# Patient Record
Sex: FEMALE | Race: WHITE | Employment: OTHER | URBAN - NONMETROPOLITAN AREA
[De-identification: names, ages, dates, MRNs, and addresses within clinical notes are randomized per-mention and may not be internally consistent; named-entity substitution may affect disease eponyms.]

---

## 2019-06-13 ENCOUNTER — HOSPITAL ENCOUNTER (EMERGENCY)
Age: 79
Discharge: HOME | End: 2019-06-13
Attending: EMERGENCY MEDICINE
Payer: MEDICARE

## 2019-06-13 VITALS
WEIGHT: 165 LBS | TEMPERATURE: 98.1 F | DIASTOLIC BLOOD PRESSURE: 78 MMHG | RESPIRATION RATE: 18 BRPM | SYSTOLIC BLOOD PRESSURE: 118 MMHG | HEIGHT: 62 IN | OXYGEN SATURATION: 99 % | BODY MASS INDEX: 30.36 KG/M2 | HEART RATE: 78 BPM

## 2019-06-13 DIAGNOSIS — N30.00 ACUTE CYSTITIS WITHOUT HEMATURIA: Primary | ICD-10-CM

## 2019-06-13 LAB
A/G RATIO: 1 (ref 1.1–2.2)
ALBUMIN SERPL-MCNC: 3.9 G/DL (ref 3.4–5)
ALP BLD-CCNC: 112 U/L (ref 40–129)
ALT SERPL-CCNC: 20 U/L (ref 10–40)
AMORPHOUS: ABNORMAL /HPF
ANION GAP SERPL CALCULATED.3IONS-SCNC: 11 MMOL/L (ref 3–16)
AST SERPL-CCNC: 25 U/L (ref 15–37)
BACTERIA: ABNORMAL /HPF
BASOPHILS ABSOLUTE: 0 K/UL (ref 0–0.2)
BASOPHILS RELATIVE PERCENT: 0.9 %
BILIRUB SERPL-MCNC: 0.4 MG/DL (ref 0–1)
BILIRUBIN URINE: NEGATIVE
BLOOD, URINE: NEGATIVE
BUN BLDV-MCNC: 23 MG/DL (ref 7–20)
CALCIUM SERPL-MCNC: 9.1 MG/DL (ref 8.3–10.6)
CHLORIDE BLD-SCNC: 105 MMOL/L (ref 99–110)
CLARITY: CLEAR
CO2: 26 MMOL/L (ref 21–32)
COLOR: YELLOW
CREAT SERPL-MCNC: 1.3 MG/DL (ref 0.6–1.2)
EKG ATRIAL RATE: 67 BPM
EKG DIAGNOSIS: NORMAL
EKG P AXIS: 48 DEGREES
EKG P-R INTERVAL: 236 MS
EKG Q-T INTERVAL: 484 MS
EKG QRS DURATION: 82 MS
EKG QTC CALCULATION (BAZETT): 511 MS
EKG R AXIS: 34 DEGREES
EKG T AXIS: 59 DEGREES
EKG VENTRICULAR RATE: 67 BPM
EOSINOPHILS ABSOLUTE: 0.4 K/UL (ref 0–0.6)
EOSINOPHILS RELATIVE PERCENT: 8.7 %
EPITHELIAL CELLS, UA: ABNORMAL /HPF
GFR AFRICAN AMERICAN: 48
GFR NON-AFRICAN AMERICAN: 40
GLOBULIN: 3.8 G/DL
GLUCOSE BLD-MCNC: 95 MG/DL (ref 70–99)
GLUCOSE BLD-MCNC: 95 MG/DL (ref 70–99)
GLUCOSE URINE: NEGATIVE MG/DL
HCT VFR BLD CALC: 36.9 % (ref 36–48)
HEMOGLOBIN: 12.1 G/DL (ref 12–16)
KETONES, URINE: NEGATIVE MG/DL
LACTIC ACID: 1.3 MMOL/L (ref 0.4–2)
LEUKOCYTE ESTERASE, URINE: ABNORMAL
LYMPHOCYTES ABSOLUTE: 1.7 K/UL (ref 1–5.1)
LYMPHOCYTES RELATIVE PERCENT: 38.4 %
MAGNESIUM: 1.9 MG/DL (ref 1.8–2.4)
MCH RBC QN AUTO: 30.6 PG (ref 26–34)
MCHC RBC AUTO-ENTMCNC: 32.9 G/DL (ref 31–36)
MCV RBC AUTO: 92.8 FL (ref 80–100)
MICROSCOPIC EXAMINATION: YES
MONOCYTES ABSOLUTE: 0.4 K/UL (ref 0–1.3)
MONOCYTES RELATIVE PERCENT: 8.8 %
NEUTROPHILS ABSOLUTE: 1.9 K/UL (ref 1.7–7.7)
NEUTROPHILS RELATIVE PERCENT: 43.2 %
NITRITE, URINE: POSITIVE
PDW BLD-RTO: 13.9 % (ref 12.4–15.4)
PERFORMED ON: NORMAL
PH UA: 5 (ref 5–8)
PLATELET # BLD: 233 K/UL (ref 135–450)
PMV BLD AUTO: 7.1 FL (ref 5–10.5)
POTASSIUM SERPL-SCNC: 4.3 MMOL/L (ref 3.5–5.1)
PROTEIN UA: NEGATIVE MG/DL
RBC # BLD: 3.97 M/UL (ref 4–5.2)
RBC UA: ABNORMAL /HPF (ref 0–2)
SODIUM BLD-SCNC: 142 MMOL/L (ref 136–145)
SPECIFIC GRAVITY UA: 1.01 (ref 1–1.03)
TOTAL PROTEIN: 7.7 G/DL (ref 6.4–8.2)
TROPONIN: <0.01 NG/ML
URINE REFLEX TO CULTURE: YES
URINE TYPE: ABNORMAL
UROBILINOGEN, URINE: 0.2 E.U./DL
WBC # BLD: 4.3 K/UL (ref 4–11)
WBC UA: ABNORMAL /HPF (ref 0–5)

## 2019-06-13 PROCEDURE — 83735 ASSAY OF MAGNESIUM: CPT

## 2019-06-13 PROCEDURE — 99285 EMERGENCY DEPT VISIT HI MDM: CPT

## 2019-06-13 PROCEDURE — 96365 THER/PROPH/DIAG IV INF INIT: CPT

## 2019-06-13 PROCEDURE — 6360000002 HC RX W HCPCS: Performed by: EMERGENCY MEDICINE

## 2019-06-13 PROCEDURE — 36415 COLL VENOUS BLD VENIPUNCTURE: CPT

## 2019-06-13 PROCEDURE — 87077 CULTURE AEROBIC IDENTIFY: CPT

## 2019-06-13 PROCEDURE — 93010 ELECTROCARDIOGRAM REPORT: CPT | Performed by: INTERNAL MEDICINE

## 2019-06-13 PROCEDURE — 87086 URINE CULTURE/COLONY COUNT: CPT

## 2019-06-13 PROCEDURE — 93005 ELECTROCARDIOGRAM TRACING: CPT | Performed by: EMERGENCY MEDICINE

## 2019-06-13 PROCEDURE — 81001 URINALYSIS AUTO W/SCOPE: CPT

## 2019-06-13 PROCEDURE — 2580000003 HC RX 258: Performed by: EMERGENCY MEDICINE

## 2019-06-13 PROCEDURE — 87040 BLOOD CULTURE FOR BACTERIA: CPT

## 2019-06-13 PROCEDURE — 96375 TX/PRO/DX INJ NEW DRUG ADDON: CPT

## 2019-06-13 PROCEDURE — 80053 COMPREHEN METABOLIC PANEL: CPT

## 2019-06-13 PROCEDURE — 84484 ASSAY OF TROPONIN QUANT: CPT

## 2019-06-13 PROCEDURE — 85025 COMPLETE CBC W/AUTO DIFF WBC: CPT

## 2019-06-13 PROCEDURE — 87186 SC STD MICRODIL/AGAR DIL: CPT

## 2019-06-13 PROCEDURE — 83605 ASSAY OF LACTIC ACID: CPT

## 2019-06-13 RX ORDER — BRINZOLAMIDE 10 MG/ML
1 SUSPENSION/ DROPS OPHTHALMIC 3 TIMES DAILY
COMMUNITY
End: 2021-09-17

## 2019-06-13 RX ORDER — CEFUROXIME AXETIL 500 MG/1
500 TABLET ORAL 2 TIMES DAILY
Qty: 14 TABLET | Refills: 0 | Status: SHIPPED | OUTPATIENT
Start: 2019-06-13 | End: 2019-06-20

## 2019-06-13 RX ORDER — 0.9 % SODIUM CHLORIDE 0.9 %
1000 INTRAVENOUS SOLUTION INTRAVENOUS ONCE
Status: COMPLETED | OUTPATIENT
Start: 2019-06-13 | End: 2019-06-13

## 2019-06-13 RX ORDER — MAGNESIUM SULFATE 1 G/100ML
1 INJECTION INTRAVENOUS ONCE
Status: COMPLETED | OUTPATIENT
Start: 2019-06-13 | End: 2019-06-13

## 2019-06-13 RX ADMIN — MAGNESIUM SULFATE HEPTAHYDRATE 1 G: 1 INJECTION, SOLUTION INTRAVENOUS at 09:42

## 2019-06-13 RX ADMIN — SODIUM CHLORIDE 1000 ML: 9 INJECTION, SOLUTION INTRAVENOUS at 09:41

## 2019-06-13 RX ADMIN — CEFTRIAXONE SODIUM 1 G: 1 INJECTION, POWDER, FOR SOLUTION INTRAMUSCULAR; INTRAVENOUS at 11:57

## 2019-06-13 NOTE — ED NOTES
Pt now responding verbally & appropriately with questions asked via staff. A & O x 3.  Denies c/o's @ present, labs pending, warm blankets given via request      Marissa Charles RN  06/13/19 0913

## 2019-06-13 NOTE — ED NOTES
Pt voided approx 150 cc of clr lt yellow urine to bedpan without incident or c/o's     David Jama RN  06/13/19 6989

## 2019-06-13 NOTE — ED PROVIDER NOTES
[facility]  eMERGENCY dEPARTMENT eNCOUnter      Pt Name: Eb Moser  MRN: [id number]  [place] 1940  Date of evaluation: 6/13/2019  Provider: Jo Ann Mireles MD    CHIEF COMPLAINT       Chief Complaint   Patient presents with    Altered Mental Status     ECF states pt with decreased LOC, unknown onset of s/s         HISTORY OF PRESENT ILLNESS   (Location/Symptom, Timing/Onset, Context/Setting, Quality, Duration, Modifying Factors, Severity)  Note limiting factors. Eb Moser is a 70 y.o. female with a complicated past medical history including Parkinson's disease, dementia, and seizures who presents from her long-term care facility for decreased level of consciousness for an unknown period of time. I have spoken to Duane Renee the patient's nurse at her nursing home. She reports that the patient is normally more oriented and able to have conversations however has not been that way today. The nursing home is unaware of the last time she was completely at her dementia baseline. They report that the patient is DNR CC and does not want any aggressive treatments. They deny any fever, inability to tolerate p.o., or syncope. Report her symptoms are mild to moderate, constant, worsening, have no known aggravating or alleviating factors, and have been ongoing for an unknown amount of time. HPI    Nursing Notes were reviewed. REVIEW OFSYSTEMS    (2-9 systems for level 4, 10 or more for level 5)     Review of Systems   Unable to perform ROS: Dementia       Except as noted above the remainder of the review of systems was reviewed and negative.        PAST MEDICAL HISTORY     Past Medical History:   Diagnosis Date    Anemia     Atherosclerotic cardiovascular disease     CAD (coronary artery disease)     CKD (chronic kidney disease)     [facility]ulsions [facility]     Dementia     Depression     Depression     Diabetes ([facility]     Diabetes mellitus ([facility]     Falls     Glaucoma     HTN (hypertension)     Hyperlipidemia     Insomnia     Parkinson disease (HCC)     Seizures (HCC)     Type II or unspecified type diabetes mellitus without mention of complication, not stated as uncontrolled     Weakness          SURGICAL HISTORY       Past Surgical History:   Procedure Laterality Date     SECTION      CHOLECYSTECTOMY      CORONARY ARTERY BYPASS GRAFT  [id number]    FRACTURE SURGERY Left     HIP SURGERY  1/25/15    right hip pinning    HYSTERECTOMY      ROTATOR CUFF REPAIR           CURRENT MEDICATIONS       Previous Medications    ACETAMINOPHEN (TYLENOL) 325 MG TABLET    Take 650 mg by mouth every 6 hours as needed for Pain. ASPIRIN 81 MG TABLET    Take 1 tablet by mouth daily. ATORVASTATIN (LIPITOR) 20 MG TABLET    TAKE 1 TABLET BY MOUTH DAILY. BISACODYL (DULCOLAX) 10 MG SUPPOSITORY    Place 10 mg rectally daily. BISACODYL (DULCOLAX) 5 MG EC TABLET    Take 5 mg by mouth daily as needed for Constipation. BRIMONIDINE (ALPHAGAN) 0.2 % OPHTHALMIC SOLUTION    1 drop 3 times daily    BRINZOLAMIDE (AZOPT) 1 % OPHTHALMIC SUSPENSION    Place 1 drop into both eyes 3 times daily    CITALOPRAM (CELEXA) 40 MG TABLET    Take 40 mg by mouth daily. DOCUSATE SODIUM (COLACE) 100 MG CAPSULE    Take 100 mg by mouth 2 times daily. INSULIN DETEMIR (LEVEMIR) 100 UNIT/ML INJECTION VIAL    Inject 12 Units into the skin nightly. LATANOPROST (XALATAN) 0.005 % OPHTHALMIC SOLUTION    Place 1 drop into both eyes nightly. LEVETIRACETAM (KEPPRA) 500 MG TABLET    Take 1 tablet by mouth 2 times daily. LORATADINE (CLARITIN) 10 MG TABLET    Take 10 mg by mouth daily. ALLERGIES     Iodides; Ambien [zolpidem tartrate]; and Cymbalta [duloxetine hcl]    FAMILY HISTORY     History reviewed. No pertinent family history. SOCIAL HISTORY       Social History     Socioeconomic History    Marital status:       Spouse name: None    Number of children: None    Years of education: None    Highest education level: None   Occupational History    None   Social Needs    Financial resource strain: None    Food insecurity:     Worry: None     Inability: None    Transportation needs:     Medical: None     Non-medical: None   Tobacco Use    Smoking status: Former Smoker     Last attempt to quit: 1995     Years since quittin.4    Smokeless tobacco: Never Used   Substance and Sexual Activity    Alcohol use: No    Drug use: No    Sexual activity: Never   Lifestyle    Physical activity:     Days per week: None     Minutes per session: None    Stress: None   Relationships    Social connections:     Talks on phone: None     Gets together: None     Attends Uats[employer] service: None     Active member of club or organization: None     Attends meetings of clubs or organizations: None     Relationship status: None    Intimate partner violence:     Fear of current or ex partner: None     Emotionally abused: None     Physically abused: None     Forced sexual activity: None   Other Topics Concern    None   Social History Narrative    None         PHYSICAL EXAM    (up to 7 for level 4, 8 or more for level 5)     ED Triage Vitals [19 0804]   BP Temp Temp Source Pulse Resp SpO2 Height Weight   (!) 147/59 98.1 °F (36.7 °C) Oral 66 20 97 % 5' 2\" (1.575 m) 165 lb (74.8 kg)       Physical Exam   Constitutional: She appears well-developed and well-nourished. HENT:   Head: Normocephalic and atraumatic. Right Ear: External ear normal.   Left Ear: External ear normal.   Eyes: Conjunctivae and EOM are normal.   Neck: Neck supple. No JVD present. No tracheal deviation present. Cardiovascular: Normal rate and intact distal pulses. Pulmonary/Chest: Effort normal and breath sounds normal. No respiratory distress. She has no wheezes. Abdominal: Soft. She exhibits no distension. There is no tenderness. There is no rebound and no guarding.    Musculoskeletal: Normal range of motion. She exhibits no tenderness or deformity. Neurological: She is alert. No cranial nerve deficit. Oriented to person and that the fact that she is in the emergency department but does not know at which medical facility and does not know the year. No facial droop. Able to stand on her own power but unable to ambulate due to weakness. Intact sensation in all 4 extremities. Generalized deconditioning and weakness equal in all 4 extremity's with no focal area of weakness. Skin: Skin is warm and dry. Capillary refill takes less than 2 seconds. She is not diaphoretic. Nursing note and vitals reviewed. DIAGNOSTIC RESULTS       LABS:  Labs Reviewed   URINE RT REFLEX TO CULTURE - Abnormal; Notable for the following components:       Result Value    Nitrite, Urine POSITIVE (*)     Leukocyte Esterase, Urine SMALL (*)     All other components within normal limits    Narrative:     Performed at:  [facility]. [facility]  [address] [place] [address]. [place] [address] [address]    Phone [phone]   CBC WITH AUTO DIFFERENTIAL - Abnormal; Notable for the following components:    RBC 3.97 (*)     All other components within normal limits    Narrative:     Performed at:  [facility]. [facility]  [address] [place] [address]. [facility][note 3] DEQ    Phone [phone]   COMPREHENSIVE METABOLIC PANEL - Abnormal; Notable for the following components:    BUN 23 (*)     CREATININE 1.3 (*)     GFR Non- 40 (*)     GFR African American 48 (*)     Albumin/Globulin Ratio 1.0 (*)     All other components within normal limits    Narrative:     Performed at:  [facility]. [facility]  [address] [place] [address].  [facility][note 3] DEQ    Phone [phone]   MICROSCOPIC URINALYSIS - Abnormal; Notable for the following components:    WBC, UA 10-20 (*)     Bacteria, UA 3+ (*)     Amorphous, UA Rare (*)     All other components within normal limits    Narrative:     Performed at:  [facility]. [facility]  [facility] [place],  [address] [place]   Phone [phone]   CULTURE BLOOD #1   CULTURE BLOOD #2   URINE CULTURE   TROPONIN    Narrative:     Performed at:  [facility]. [facility]  [facility] [place],  [address]. [facility]   Phone [phone]   LACTIC ACID, PLASMA    Narrative:     Performed at:  [facility]. [facility]  [facility] [place],  [address]   Phone [phone]   MAGNESIUM    Narrative:     Performed at:  [facility]. [facility]  [facility] [place],  [address]. [facility]   Phone [phone]   POCT GLUCOSE    Narrative:     Performed at:  [facility]. [facility]  [facility],  [address]. [facility]   Phone [phone]       All otherlabs were within normal range or not returned as of this dictation. EMERGENCY DEPARTMENT COURSE and DIFFERENTIAL DIAGNOSIS/MDM:   Vitals:    Vitals:    06/13/19 0804 06/13/19 0936 06/13/19 1117   BP: (!) 147/59 (!) 128/54 (!) 124/54   Pulse: 66 59 71   Resp: 20 18 18   Temp: 98.1 °F (36.7 °C)     TempSrc: Oral     SpO2: 97% 98% 99%   Weight: 165 lb (74.8 kg)     Height: 5' 2\" (1.575 m)           MDM  The patient is afebrile and hemodynamically stable. Laboratory evaluation shows acute cystitis with no signs of acute sepsis with normal white blood cell count, normal lactic acid, no fever or hemodynamic instability. There are no focal signs to suggest stroke or CNS lesion. I have spoken to [facility] the nurse at the patient's long  term care facility who was comfortable with the patient receiving 1 dose of IV antibiotics in the emergency department being sent home with p.o. Ceftin.   I have warned the nursing home but the patient may decompensate given her worsening mental status and may need to be sent back to the emergency department for further evaluation. As the patient is DNR CC they agree with current plan to give antibiotics and not admit to the hospital overnight for more aggressive treatment and work-up at this time. The patient is transferred back to nursing home with strict ER return precautions and a prescription for Ceftin. Procedures    FINAL IMPRESSION      1. Acute cystitis without hematuria          DISPOSITION/PLAN   DISPOSITION Decision To Discharge 06/13/2019 12:17:48 PM      PATIENT REFERRED TO:  Rosa Pennington MD  [facility]. [facility]  [phone]    In 4 days      [place]. [facility] Emergency Department  [address] [place] [address],[address]  [phone]    If symptoms worsen      DISCHARGE MEDICATIONS:  New Prescriptions    CEFUROXIME (CEFTIN) 500 MG TABLET    Take 1 tablet by mouth 2 times daily for 7 days          (Please note that portions of this note were completed with a voice recognition program.  Efforts were made to edit the dictations but occasionally words aremis-transcribed. )    Keisha Golden MD (electronically signed)  Attending Emergency Physician           Keisha Golden MD  06/13/19 7195

## 2019-06-13 NOTE — ED NOTES
Pt to ER via EMS with reported altered mental status, onset of s/s unknown. Upon arrival to ER, pt alert and cooperative with care, pt not responding verbally to asked questions, but will shake her head yes and no appropriately. Cardiac monitor applied, resps even and unlab.  MD made aware      Ann Stanford RN  06/13/19 5163

## 2019-06-15 LAB
ORGANISM: ABNORMAL
URINE CULTURE, ROUTINE: ABNORMAL
URINE CULTURE, ROUTINE: ABNORMAL

## 2019-06-18 LAB
BLOOD CULTURE, ROUTINE: NORMAL
CULTURE, BLOOD 2: NORMAL

## 2021-09-17 ENCOUNTER — APPOINTMENT (OUTPATIENT)
Dept: GENERAL RADIOLOGY | Age: 81
End: 2021-09-17
Payer: MEDICARE

## 2021-09-17 ENCOUNTER — HOSPITAL ENCOUNTER (EMERGENCY)
Age: 81
Discharge: HOME | End: 2021-09-17
Attending: EMERGENCY MEDICINE
Payer: MEDICARE

## 2021-09-17 VITALS
OXYGEN SATURATION: 96 % | BODY MASS INDEX: 30.18 KG/M2 | DIASTOLIC BLOOD PRESSURE: 72 MMHG | RESPIRATION RATE: 23 BRPM | TEMPERATURE: 102.5 F | HEART RATE: 70 BPM | SYSTOLIC BLOOD PRESSURE: 139 MMHG | WEIGHT: 165 LBS

## 2021-09-17 DIAGNOSIS — E87.70 HYPERVOLEMIA, UNSPECIFIED HYPERVOLEMIA TYPE: ICD-10-CM

## 2021-09-17 DIAGNOSIS — N30.00 ACUTE CYSTITIS WITHOUT HEMATURIA: Primary | ICD-10-CM

## 2021-09-17 LAB
A/G RATIO: 1 (ref 1.1–2.2)
ALBUMIN SERPL-MCNC: 3.3 G/DL (ref 3.4–5)
ALP BLD-CCNC: 98 U/L (ref 40–129)
ALT SERPL-CCNC: 15 U/L (ref 10–40)
ANION GAP SERPL CALCULATED.3IONS-SCNC: 10 MMOL/L (ref 3–16)
APTT: 31 SEC (ref 26.2–38.6)
AST SERPL-CCNC: 21 U/L (ref 15–37)
BACTERIA: ABNORMAL /HPF
BASE EXCESS VENOUS: -3.8 MMOL/L (ref -3–3)
BASOPHILS ABSOLUTE: 0 K/UL (ref 0–0.2)
BASOPHILS RELATIVE PERCENT: 0.6 %
BILIRUB SERPL-MCNC: <0.2 MG/DL (ref 0–1)
BILIRUBIN URINE: NEGATIVE
BLOOD, URINE: ABNORMAL
BUN BLDV-MCNC: 31 MG/DL (ref 7–20)
CALCIUM SERPL-MCNC: 8.3 MG/DL (ref 8.3–10.6)
CARBOXYHEMOGLOBIN: 1.4 % (ref 0–1.5)
CHLORIDE BLD-SCNC: 107 MMOL/L (ref 99–110)
CLARITY: ABNORMAL
CO2: 23 MMOL/L (ref 21–32)
COLOR: YELLOW
CREAT SERPL-MCNC: 1.4 MG/DL (ref 0.6–1.2)
EKG ATRIAL RATE: 69 BPM
EKG DIAGNOSIS: NORMAL
EKG P AXIS: -18 DEGREES
EKG P-R INTERVAL: 238 MS
EKG Q-T INTERVAL: 454 MS
EKG QRS DURATION: 78 MS
EKG QTC CALCULATION (BAZETT): 486 MS
EKG R AXIS: 26 DEGREES
EKG T AXIS: 44 DEGREES
EKG VENTRICULAR RATE: 69 BPM
EOSINOPHILS ABSOLUTE: 0.1 K/UL (ref 0–0.6)
EOSINOPHILS RELATIVE PERCENT: 1.6 %
EPITHELIAL CELLS, UA: ABNORMAL /HPF (ref 0–5)
GFR AFRICAN AMERICAN: 44
GFR NON-AFRICAN AMERICAN: 36
GLOBULIN: 3.4 G/DL
GLUCOSE BLD-MCNC: 203 MG/DL (ref 70–99)
GLUCOSE URINE: NEGATIVE MG/DL
HCO3 VENOUS: 21.7 MMOL/L (ref 23–29)
HCT VFR BLD CALC: 33.9 % (ref 36–48)
HEMOGLOBIN: 11.5 G/DL (ref 12–16)
INR BLD: 0.98 (ref 0.88–1.12)
KETONES, URINE: NEGATIVE MG/DL
LACTIC ACID, SEPSIS: 2 MMOL/L (ref 0.4–1.9)
LACTIC ACID, SEPSIS: 2.1 MMOL/L (ref 0.4–1.9)
LEUKOCYTE ESTERASE, URINE: ABNORMAL
LYMPHOCYTES ABSOLUTE: 1.7 K/UL (ref 1–5.1)
LYMPHOCYTES RELATIVE PERCENT: 30.7 %
MCH RBC QN AUTO: 31.2 PG (ref 26–34)
MCHC RBC AUTO-ENTMCNC: 33.8 G/DL (ref 31–36)
MCV RBC AUTO: 92.3 FL (ref 80–100)
METHEMOGLOBIN VENOUS: 0.3 %
MICROSCOPIC EXAMINATION: YES
MONOCYTES ABSOLUTE: 0.6 K/UL (ref 0–1.3)
MONOCYTES RELATIVE PERCENT: 11.1 %
NEUTROPHILS ABSOLUTE: 3.2 K/UL (ref 1.7–7.7)
NEUTROPHILS RELATIVE PERCENT: 56 %
NITRITE, URINE: NEGATIVE
O2 CONTENT, VEN: 13 VOL %
O2 SAT, VEN: 76 %
O2 THERAPY: ABNORMAL
PCO2, VEN: 40.6 MMHG (ref 40–50)
PDW BLD-RTO: 13.1 % (ref 12.4–15.4)
PH UA: 5.5 (ref 5–8)
PH VENOUS: 7.34 (ref 7.35–7.45)
PLATELET # BLD: 185 K/UL (ref 135–450)
PMV BLD AUTO: 7.1 FL (ref 5–10.5)
PO2, VEN: 43.1 MMHG (ref 25–40)
POTASSIUM REFLEX MAGNESIUM: 4.7 MMOL/L (ref 3.5–5.1)
PRO-BNP: 4046 PG/ML (ref 0–449)
PROTEIN UA: 30 MG/DL
PROTHROMBIN TIME: 11.2 SEC (ref 9.9–12.7)
RBC # BLD: 3.67 M/UL (ref 4–5.2)
RBC UA: ABNORMAL /HPF (ref 0–4)
SODIUM BLD-SCNC: 140 MMOL/L (ref 136–145)
SPECIFIC GRAVITY UA: 1.02 (ref 1–1.03)
TCO2 CALC VENOUS: 23 MMOL/L
TOTAL PROTEIN: 6.7 G/DL (ref 6.4–8.2)
TROPONIN: <0.01 NG/ML
URINE REFLEX TO CULTURE: YES
URINE TYPE: ABNORMAL
UROBILINOGEN, URINE: 0.2 E.U./DL
WBC # BLD: 5.7 K/UL (ref 4–11)
WBC UA: >100 /HPF (ref 0–5)

## 2021-09-17 PROCEDURE — 2580000003 HC RX 258: Performed by: EMERGENCY MEDICINE

## 2021-09-17 PROCEDURE — 6370000000 HC RX 637 (ALT 250 FOR IP)

## 2021-09-17 PROCEDURE — 87086 URINE CULTURE/COLONY COUNT: CPT

## 2021-09-17 PROCEDURE — 93005 ELECTROCARDIOGRAM TRACING: CPT | Performed by: EMERGENCY MEDICINE

## 2021-09-17 PROCEDURE — 81001 URINALYSIS AUTO W/SCOPE: CPT

## 2021-09-17 PROCEDURE — 83605 ASSAY OF LACTIC ACID: CPT

## 2021-09-17 PROCEDURE — 6360000002 HC RX W HCPCS: Performed by: EMERGENCY MEDICINE

## 2021-09-17 PROCEDURE — 99285 EMERGENCY DEPT VISIT HI MDM: CPT

## 2021-09-17 PROCEDURE — 36415 COLL VENOUS BLD VENIPUNCTURE: CPT

## 2021-09-17 PROCEDURE — 80053 COMPREHEN METABOLIC PANEL: CPT

## 2021-09-17 PROCEDURE — 71045 X-RAY EXAM CHEST 1 VIEW: CPT

## 2021-09-17 PROCEDURE — 85610 PROTHROMBIN TIME: CPT

## 2021-09-17 PROCEDURE — 93010 ELECTROCARDIOGRAM REPORT: CPT | Performed by: INTERNAL MEDICINE

## 2021-09-17 PROCEDURE — 85730 THROMBOPLASTIN TIME PARTIAL: CPT

## 2021-09-17 PROCEDURE — 87077 CULTURE AEROBIC IDENTIFY: CPT

## 2021-09-17 PROCEDURE — 85025 COMPLETE CBC W/AUTO DIFF WBC: CPT

## 2021-09-17 PROCEDURE — 83880 ASSAY OF NATRIURETIC PEPTIDE: CPT

## 2021-09-17 PROCEDURE — 84484 ASSAY OF TROPONIN QUANT: CPT

## 2021-09-17 PROCEDURE — 87186 SC STD MICRODIL/AGAR DIL: CPT

## 2021-09-17 PROCEDURE — 96365 THER/PROPH/DIAG IV INF INIT: CPT

## 2021-09-17 PROCEDURE — 6370000000 HC RX 637 (ALT 250 FOR IP): Performed by: EMERGENCY MEDICINE

## 2021-09-17 PROCEDURE — 87040 BLOOD CULTURE FOR BACTERIA: CPT

## 2021-09-17 PROCEDURE — 82803 BLOOD GASES ANY COMBINATION: CPT

## 2021-09-17 RX ORDER — FUROSEMIDE 40 MG/1
40 TABLET ORAL ONCE
Status: COMPLETED | OUTPATIENT
Start: 2021-09-17 | End: 2021-09-17

## 2021-09-17 RX ORDER — CEPHALEXIN 500 MG/1
500 CAPSULE ORAL 4 TIMES DAILY
Qty: 28 CAPSULE | Refills: 0 | Status: SHIPPED | OUTPATIENT
Start: 2021-09-17 | End: 2021-09-24

## 2021-09-17 RX ORDER — BRINZOLAMIDE 10 MG/ML
1 SUSPENSION/ DROPS OPHTHALMIC 3 TIMES DAILY
COMMUNITY

## 2021-09-17 RX ORDER — AMMONIUM LACTATE 12 G/100G
LOTION TOPICAL EVERY 12 HOURS
COMMUNITY

## 2021-09-17 RX ORDER — ACETAMINOPHEN 325 MG/1
TABLET ORAL
Status: COMPLETED
Start: 2021-09-17 | End: 2021-09-17

## 2021-09-17 RX ORDER — ESCITALOPRAM OXALATE 20 MG/1
20 TABLET ORAL DAILY
COMMUNITY

## 2021-09-17 RX ORDER — LEVOTHYROXINE SODIUM 0.05 MG/1
50 TABLET ORAL DAILY
COMMUNITY

## 2021-09-17 RX ORDER — ACETAMINOPHEN 325 MG/1
650 TABLET ORAL ONCE
Status: COMPLETED | OUTPATIENT
Start: 2021-09-17 | End: 2021-09-17

## 2021-09-17 RX ORDER — TIMOLOL MALEATE 5 MG/ML
1 SOLUTION/ DROPS OPHTHALMIC 2 TIMES DAILY
COMMUNITY

## 2021-09-17 RX ORDER — IPRATROPIUM BROMIDE AND ALBUTEROL SULFATE 2.5; .5 MG/3ML; MG/3ML
1 SOLUTION RESPIRATORY (INHALATION) EVERY 4 HOURS
COMMUNITY

## 2021-09-17 RX ORDER — LANOLIN ALCOHOL/MO/W.PET/CERES
6 CREAM (GRAM) TOPICAL DAILY
COMMUNITY

## 2021-09-17 RX ORDER — MIRTAZAPINE 7.5 MG/1
7.5 TABLET, FILM COATED ORAL NIGHTLY
COMMUNITY

## 2021-09-17 RX ORDER — GUAIFENESIN 600 MG/1
1200 TABLET, EXTENDED RELEASE ORAL 2 TIMES DAILY
COMMUNITY

## 2021-09-17 RX ADMIN — CEFTRIAXONE SODIUM 1000 MG: 1 INJECTION, POWDER, FOR SOLUTION INTRAMUSCULAR; INTRAVENOUS at 18:05

## 2021-09-17 RX ADMIN — ACETAMINOPHEN 650 MG: 325 TABLET ORAL at 22:30

## 2021-09-17 RX ADMIN — FUROSEMIDE 40 MG: 40 TABLET ORAL at 19:25

## 2021-09-17 ASSESSMENT — PAIN SCALES - GENERAL: PAINLEVEL_OUTOF10: 0

## 2021-09-17 NOTE — Clinical Note
Pt is alert and oriented within her own normal limits, stable for transfer, iv out, cath intact, pressure and dressing applied, pt tolerated well.  Per EMS, they took last set of vitals,

## 2021-09-17 NOTE — ED PROVIDER NOTES
[facility]      Pt Name: Jatinder Dalton  MRN: [id number]  [place] 1940  Date of evaluation: 9/17/2021  Provider: Sabina David MD    CHIEF COMPLAINT       Chief Complaint   Patient presents with    Shortness of Breath     From [facility] for reported fever of 100.6F and SpO2 80's         HISTORY OF PRESENT ILLNESS   (Location/Symptom, Timing/Onset, Context/Setting, Quality, Duration, Modifying Factors, Severity)  Note limiting factors. Jatinder Dalton is a 80 y.o. female with past medical history of hypertension, hyperlipidemia, coronary artery disease status post CABG, diabetes here today for shortness of breath    Patient presents the emergency department today for reported shortness of breath. She resides in a nursing home and they report that her oxygen level was in the 80s and she had a temperature of 100.6. She does admit to occasional shortness of breath but none at present. Denies cough. No chest pain. States she does feel somewhat weak in general.  She is unaware of any fevers that she may have had. She denies abdominal pain, vomiting or diarrhea. States she is some chronic leg swelling which is unchanged. States she feels more or less okay at present. No known sick contacts. HPI    Nursing Notes were reviewed. REVIEW OF SYSTEMS    (2-9 systems for level 4, 10 or more for level 5)     Review of Systems    Please see HPI for pertinent positive and negative review of system findings. A full 10 system ROS was performed and otherwise negative.         PAST MEDICAL HISTORY     Past Medical History:   Diagnosis Date    Anemia     Atherosclerotic cardiovascular disease     CAD (coronary artery disease)     CKD (chronic kidney disease)     Convulsions (HCC)     D[facility] ([facility]     Depression     Depression     Diabetes ([facility]     Diabetes mellitus ([facility]     Falls     Glaucoma     HTN (hypertension)     mouth 2 times daily. LEVOTHYROXINE (SYNTHROID) 50 MCG TABLET    Take 50 mcg by mouth Daily    MAGNESIUM HYDROXIDE (MILK OF MAGNESIA) 400 MG/5ML SUSPENSION    Take 30 mLs by mouth daily as needed for Constipation    MELATONIN 3 MG TABS TABLET    Take 6 mg by mouth daily    MIRTAZAPINE (REMERON) 7.5 MG TABLET    Take 7.5 mg by mouth nightly    NYSTATIN-TRIAMCINOLONE (MYCOLOG II) 774654-3.1 UNIT/GM-% CREAM    Apply topically every 12 hours as needed Apply topically 4 times daily. TIMOLOL (TIMOPTIC) 0.5 % OPHTHALMIC SOLUTION    1 drop 2 times daily       ALLERGIES     Iodides, Ambien [zolpidem tartrate], Cymbalta [duloxetine hcl], Iodine, and Zolpidem    FAMILY HISTORY     History reviewed. No pertinent family history. SOCIAL HISTORY       Social History     Socioeconomic History    Marital status:      Spouse name: None    Number of children: None    Years of education: None    Highest education level: None   Occupational History    None   Tobacco Use    Smoking status: Former Smoker     Quit date: 1995     Years since quittin.7    Smokeless tobacco: Never Used   Vaping Use    Vaping Use: Never used   Substance and Sexual Activity    Alcohol use: No    Drug use: No    Sexual activity: Never   Other Topics Concern    None   Social History Narrative    None     Social Determinants of Health     Financial Resource Strain:     Difficulty of Paying Living Expenses:    Food Insecurity:     Worried About Running Out of Food in the Last Year:     [address] in the Last Year:    Transportation Needs:     Lack of Transportation (Medical):      Lack of Transportation (Non-Medical):    Physical Activity:     Days of Exercise per Week:     Minutes of Exercise per Session:    Stress:     Feeling of Stress :    Social Connections:     Frequency of Communication with Friends and Family:     Frequency of Social Gatherings with Friends and Family:     Attends Restoration[employer] Services:  Active Member of Clubs or Organizations:     Attends Club or Organization Meetings:     Marital Status:    Intimate Partner Violence:     Fear of Current or Ex-Partner:     Emotionally Abused:     Physically Abused:     Sexually Abused:        SCREENINGS    [place] Coma Scale  Eye Opening: Spontaneous  Best Verbal Response: Confused  Best Motor Response: Obeys commands  [place] Coma Scale Score: 14          PHYSICAL EXAM    (up to 7 for level 4, 8 or more for level 5)     ED Triage Vitals [09/17/21 1609]   BP Temp Temp Source Pulse Resp SpO2 Height Weight   (!) 118/51 99.4 °F (37.4 °C) Oral 68 20 95 % -- 165 lb (74.8 kg)       Physical Exam    General appearance:  Cooperative. No acute distress. Skin:  Warm. Dry. Eye:  Extraocular movements intact. Ears, nose, mouth and throat:  Oral mucosa moist,  Neck:  Trachea midline. Heart:  Regular rate and rhythm  Perfusion:  intact  Respiratory:  Lungs clear to auscultation bilaterally. Respirations nonlabored. Abdominal:   Non distended. Nontender  Neurological:  Alert and oriented x 3. Moves all extremities spontaneously  Musculoskeletal:   Normal ROM, no deformities          Psychiatric:  Normal mood      DIAGNOSTIC RESULTS       Labs Reviewed   CBC WITH AUTO DIFFERENTIAL - Abnormal; Notable for the following components:       Result Value    RBC 3.67 (*)     Hemoglobin 11.5 (*)     Hematocrit 33.9 (*)     All other components within normal limits    Narrative:     Performed at:  [facility]. [facility]  [address] [place] [address].  [place], [address]   Phone [phone]   COMPREHENSIVE METABOLIC PANEL W/ REFLEX TO MG FOR LOW K - Abnormal; Notable for the following components:    Glucose 203 (*)     BUN 31 (*)     CREATININE 1.4 (*)     GFR Non- 36 (*)     GFR  44 (*)     Albumin 3.3 (*)     Albumin/Globulin Ratio 1.0 (*)     All other components within normal limits    Narrative: Performed at:  [facility]. [facility]  [address] [place] [address]. [place], [address]   Phone [phone] - Abnormal; Notable for the following components:    Pro-BNP 4,046 (*)     All other components within normal limits    Narrative:     Performed at:  [facility]. [facility]  [address] [place] [address]. [facility], [address]   Phone [phone]   LACTATE, SEPSIS - Abnormal; Notable for the following components:    Lactic Acid, Sepsis 2.0 (*)     All other components within normal limits    Narrative:     Performed at:  [facility]. [facility]  [address] [place] [address]. [facility], [facility]   Phone [phone]   BLOOD GAS, VENOUS - Abnormal; Notable for the following components:    pH, Janes 7.345 (*)     pO2, Janes 43.1 (*)     HCO3, Venous 21.7 (*)     Base Excess, Janes -3.8 (*)     All other components within normal limits    Narrative:     Performed at:  [facility]. [facility]  [address] [place] [address]. [place], [address]   Phone [phone]   URINE RT REFLEX TO CULTURE - Abnormal; Notable for the following components:    Clarity, UA SL CLOUDY (*)     Blood, Urine TRACE-INTACT (*)     Protein, UA 30 (*)     Leukocyte Esterase, Urine MODERATE (*)     All other components within normal limits    Narrative:     Performed at:  [facility]. [facility]  [address] [place] [address]. [facility], [address]   Phone [phone]   MICROSCOPIC URINALYSIS - Abnormal; Notable for the following components:    WBC, UA >100 (*)     Bacteria, UA 3+ (*)     All other components within normal limits    Narrative:     Performed at:  [facility]. [facility]  [address] [place] [address].  [facility], [address]   Phone [phone]   CULTURE, BLOOD 1   CULTURE, BLOOD 2   CULTURE, URINE   TROPONIN    Narrative:     Performed at:  [facility] [address] [facility]  [address] [place] [address]. [place], [address]   Phone [phone]    Narrative:     Performed at:  [facility]. [facility]  [address] [place] [address]. [place], [facility] [address]   Phone [phone]   APTT    Narrative:     Performed at:  [facility]. [facility]  [address] [place] [address]. [place], [facility] [address]   Phone [phone]   LACTATE, SEPSIS       Interpretation per the Radiologist below, if obtained/available at the time of this note:    XR CHEST PORTABLE   Final Result   Cardiomegaly and interstitial change possible interstitial edema. Underlying   or pre-existing fibrosis is also considered. RECOMMENDATION:   Follow-up recommended preferably with PA and lateral upright views. All other labs/imaging were within normal range or not returned as of this dictation. EMERGENCY DEPARTMENT COURSE and DIFFERENTIAL DIAGNOSIS/MDM:   Vitals:    Vitals:    09/17/21 1609 09/17/21 1713   BP: (!) 118/51 (!) 131/57   Pulse: 68 71   Resp: 20 22   Temp: 99.4 °F (37.4 °C)    TempSrc: Oral    SpO2: 95% 96%   Weight: 165 lb (74.8 kg)        EKG: Sinus rhythm first-degree AV block rate of 69 bpm.  Low voltage QRS. No ST elevation. Patient presents to the emergency department today for reported hypoxia and fevers at her nursing home. There was question if she might of had some mild confusion there however here is awake alert and oriented and acting appropriately. She reports occasional shortness of breath but denies any at present. Oxygen saturations here no less than 95% on room air alone. No chest pain. Found to have elevated BNP and a chest x-ray with some fluid overload. No chest pain at present. Given Lasix here for mild fluid overload but ultimately suspect her underlying fever at the nursing home was likely from a urinary tract infection.   Was given Rocephin here but is resting comfortably, awake alert and oriented with no oxygen requirement and likely can be treated as an outpatient with Keflex  MDM    CONSULTS     None    Critical Care:   None    REASSESSMENT          PROCEDURE     Unless otherwise noted below, none     Procedures      FINAL IMPRESSION      1. Acute cystitis without hematuria    2. Hypervolemia, unspecified hypervolemia type            DISPOSITION/PLAN   DISPOSITION Decision To Discharge 09/17/2021 06:11:06 PM        PATIENT REFERRED TO:  Johann Elias, [address] [address] [place] [address]   [phone]    Schedule an appointment as soon as possible for a visit         DISCHARGE MEDICATIONS:  New Prescriptions    CEPHALEXIN (KEFLEX) 500 MG CAPSULE    Take 1 capsule by mouth 4 times daily for 7 days     Controlled Substances Monitoring:     No flowsheet data found.     (Please note that portions of this note were completed with a voice recognition program.  Efforts were made to edit the dictations but occasionally words are mis-transcribed.)    Rosa Dos Santos MD (electronically signed)  Attending Emergency Physician            Licha Ayala MD  09/17/21 4948

## 2021-09-18 NOTE — PROGRESS NOTES
Pt is confused, she is cooperative after being reoriented, pt kept attempted to get out of bed, however [employer] repositioned, her applied warm blankets and decreased lighting to decrease stimulation, pt has responded well at this time, she appears calm and resting.  Per updated ems will be here around one hour to take her back to the [address] [place] [address]

## 2021-09-20 LAB
ORGANISM: ABNORMAL
URINE CULTURE, ROUTINE: ABNORMAL

## 2021-09-21 NOTE — ED NOTES
Prasanna Macias called at this time requesting follow up on blood cultures. RN informed Darylene Ek that no growth was found.       Sandra Zhou RN  09/21/21 8671

## 2021-09-22 LAB
BLOOD CULTURE, ROUTINE: NORMAL
CULTURE, BLOOD 2: NORMAL

## 2022-11-21 ENCOUNTER — HOSPITAL ENCOUNTER (EMERGENCY)
Age: 82
Discharge: HOME | End: 2022-11-21
Attending: EMERGENCY MEDICINE
Payer: MEDICARE

## 2022-11-21 ENCOUNTER — APPOINTMENT (OUTPATIENT)
Dept: GENERAL RADIOLOGY | Age: 82
End: 2022-11-21
Payer: MEDICARE

## 2022-11-21 VITALS
HEIGHT: 62 IN | HEART RATE: 58 BPM | TEMPERATURE: 98.2 F | SYSTOLIC BLOOD PRESSURE: 156 MMHG | WEIGHT: 160 LBS | DIASTOLIC BLOOD PRESSURE: 48 MMHG | OXYGEN SATURATION: 95 % | BODY MASS INDEX: 29.44 KG/M2 | RESPIRATION RATE: 18 BRPM

## 2022-11-21 DIAGNOSIS — R07.89 ATYPICAL CHEST PAIN: Primary | ICD-10-CM

## 2022-11-21 LAB
A/G RATIO: 1.2 (ref 1.1–2.2)
ALBUMIN SERPL-MCNC: 3.2 G/DL (ref 3.4–5)
ALP BLD-CCNC: 85 U/L (ref 40–129)
ALT SERPL-CCNC: 13 U/L (ref 10–40)
ANION GAP SERPL CALCULATED.3IONS-SCNC: 9 MMOL/L (ref 3–16)
AST SERPL-CCNC: 18 U/L (ref 15–37)
BASOPHILS ABSOLUTE: 0 K/UL (ref 0–0.2)
BASOPHILS RELATIVE PERCENT: 0.7 %
BILIRUB SERPL-MCNC: 0.3 MG/DL (ref 0–1)
BUN BLDV-MCNC: 23 MG/DL (ref 7–20)
CALCIUM SERPL-MCNC: 8.4 MG/DL (ref 8.3–10.6)
CHLORIDE BLD-SCNC: 109 MMOL/L (ref 99–110)
CO2: 26 MMOL/L (ref 21–32)
CREAT SERPL-MCNC: 1.1 MG/DL (ref 0.6–1.2)
EKG ATRIAL RATE: 312 BPM
EKG ATRIAL RATE: 58 BPM
EKG DIAGNOSIS: NORMAL
EKG DIAGNOSIS: NORMAL
EKG P-R INTERVAL: 242 MS
EKG Q-T INTERVAL: 502 MS
EKG Q-T INTERVAL: 526 MS
EKG QRS DURATION: 74 MS
EKG QRS DURATION: 80 MS
EKG QTC CALCULATION (BAZETT): 516 MS
EKG QTC CALCULATION (BAZETT): 530 MS
EKG R AXIS: 42 DEGREES
EKG R AXIS: 51 DEGREES
EKG T AXIS: 29 DEGREES
EKG T AXIS: 36 DEGREES
EKG VENTRICULAR RATE: 58 BPM
EKG VENTRICULAR RATE: 67 BPM
EOSINOPHILS ABSOLUTE: 0.4 K/UL (ref 0–0.6)
EOSINOPHILS RELATIVE PERCENT: 6.2 %
GFR SERPL CREATININE-BSD FRML MDRD: 51 ML/MIN/{1.73_M2}
GLUCOSE BLD-MCNC: 85 MG/DL (ref 70–99)
HCT VFR BLD CALC: 33.3 % (ref 36–48)
HEMOGLOBIN: 11.1 G/DL (ref 12–16)
LIPASE: 21 U/L (ref 13–60)
LYMPHOCYTES ABSOLUTE: 2.1 K/UL (ref 1–5.1)
LYMPHOCYTES RELATIVE PERCENT: 35.9 %
MCH RBC QN AUTO: 30.6 PG (ref 26–34)
MCHC RBC AUTO-ENTMCNC: 33.4 G/DL (ref 31–36)
MCV RBC AUTO: 91.7 FL (ref 80–100)
MONOCYTES ABSOLUTE: 0.4 K/UL (ref 0–1.3)
MONOCYTES RELATIVE PERCENT: 7 %
NEUTROPHILS ABSOLUTE: 3 K/UL (ref 1.7–7.7)
NEUTROPHILS RELATIVE PERCENT: 50.2 %
PDW BLD-RTO: 13.5 % (ref 12.4–15.4)
PLATELET # BLD: 194 K/UL (ref 135–450)
PMV BLD AUTO: 7.3 FL (ref 5–10.5)
POTASSIUM REFLEX MAGNESIUM: 4.3 MMOL/L (ref 3.5–5.1)
RBC # BLD: 3.63 M/UL (ref 4–5.2)
SODIUM BLD-SCNC: 144 MMOL/L (ref 136–145)
TOTAL PROTEIN: 5.8 G/DL (ref 6.4–8.2)
TROPONIN: 0.01 NG/ML
TROPONIN: <0.01 NG/ML
WBC # BLD: 6 K/UL (ref 4–11)

## 2022-11-21 PROCEDURE — 36415 COLL VENOUS BLD VENIPUNCTURE: CPT

## 2022-11-21 PROCEDURE — 6370000000 HC RX 637 (ALT 250 FOR IP): Performed by: EMERGENCY MEDICINE

## 2022-11-21 PROCEDURE — 83690 ASSAY OF LIPASE: CPT

## 2022-11-21 PROCEDURE — 99285 EMERGENCY DEPT VISIT HI MDM: CPT

## 2022-11-21 PROCEDURE — 84484 ASSAY OF TROPONIN QUANT: CPT

## 2022-11-21 PROCEDURE — 85025 COMPLETE CBC W/AUTO DIFF WBC: CPT

## 2022-11-21 PROCEDURE — 93005 ELECTROCARDIOGRAM TRACING: CPT | Performed by: EMERGENCY MEDICINE

## 2022-11-21 PROCEDURE — 71045 X-RAY EXAM CHEST 1 VIEW: CPT

## 2022-11-21 PROCEDURE — 80053 COMPREHEN METABOLIC PANEL: CPT

## 2022-11-21 RX ORDER — ASPIRIN 81 MG/1
162 TABLET, CHEWABLE ORAL ONCE
Status: COMPLETED | OUTPATIENT
Start: 2022-11-21 | End: 2022-11-21

## 2022-11-21 RX ADMIN — ASPIRIN 162 MG: 81 TABLET, CHEWABLE ORAL at 10:27

## 2022-11-21 ASSESSMENT — PAIN - FUNCTIONAL ASSESSMENT
PAIN_FUNCTIONAL_ASSESSMENT: NONE - DENIES PAIN
PAIN_FUNCTIONAL_ASSESSMENT: NONE - DENIES PAIN

## 2022-11-21 NOTE — ED NOTES
Report to [facility] for transport back to [facility]. The patient is alert and oriented to person and place at this time. AVS sent with the transport team for [facility] staff.       Ida Snell RN  11/21/22 2755

## 2022-11-21 NOTE — ED TRIAGE NOTES
Patient to the ED via EMS from [facility] for c/o chest pain this AM. Per ECF staff, they administered  mg and Nitro SL x1 prior to EMS arrival. Patient chest pain for EMS or this nurse at triage. States she did have chest pain earlier in the morning. Patient is alert to self and place.

## 2022-11-21 NOTE — ED PROVIDER NOTES
Emergency Department Provider Note  Location: [address] EMERGENCY DEPARTMENT  2022     Patient Identification  Paramjit Machuca is a [de-identified] y.o. female    Chief Complaint  Chest Pain          HPI  (History provided by patient and EMS)  Patient is an 82-year-old female with advanced Lewy body dementia confused at baseline, complex medical history, presents from the [address] [place] [address] for concerns for chest pain episode that occurred this morning. Patient is poor historian. Per EMS and nursing home staff she complained of sternal chest pain. She received 2 aspirin and nitroglycerin in route. On arrival here patient states she has no complaints. No noted associated or modifying factors. I have reviewed the following nursing documentation:  Allergies: Allergies   Allergen Reactions    Iodides Hives and Shortness Of Breath    Ambien [Zolpidem Tartrate]     Cymbalta [Duloxetine Hcl]     Iodine     Zolpidem        Past medical history:  has a past medical history of Anemia, Atherosclerotic cardiovascular disease, CAD (coronary artery disease), CKD (chronic kidney disease), Convulsions ([address] Dementia ([address] Depression, Depression, Diabetes ([address] Diabetes mellitus ([address] Falls, Glaucoma, HTN (hypertension), Hyperlipidemia, Insomnia, Parkinson disease ([address] Seizures ([address] Type II or unspecified type diabetes mellitus without mention of complication, not stated as uncontrolled, and Weakness. Past surgical history:  has a past surgical history that includes Coronary artery bypass graft ([id number]); Cholecystectomy; Hysterectomy;  section; Rotator cuff repair; fracture surgery (Left); and hip surgery (1/25/15). Home medications:   Prior to Admission medications    Medication Sig Start Date End Date Taking? Authorizing Provider   ammonium lactate (LAC-HYDRIN) 12 % lotion Apply topically every 12 hours Apply topically as needed.     Historical Provider, MD   brinzolamide (AZOPT) 1 % ophthalmic suspension 1 drop 3 times daily    Historical Provider, MD   escitalopram (LEXAPRO) 20 MG tablet Take 20 mg by mouth daily    Historical Provider, MD   guaiFENesin (MUCINEX) 600 MG extended release tablet Take 1,200 mg by mouth 2 times daily    Historical Provider, MD   ipratropium-albuterol (DUONEB) 0.5-2.5 (3) MG/3ML SOLN nebulizer solution Inhale 1 vial into the lungs every 4 hours    Historical Provider, MD   LACTOBACILLUS PO Take 1 capsule by mouth 2 times daily    Historical Provider, MD   melatonin 3 MG TABS tablet Take 6 mg by mouth daily    Historical Provider, MD   magnesium hydroxide (MILK OF MAGNESIA) 400 MG/5ML suspension Take 30 mLs by mouth daily as needed for Constipation    Historical Provider, MD   mirtazapine (REMERON) 7.5 MG tablet Take 7.5 mg by mouth nightly    Historical Provider, MD   nystatin-triamcinolone (MYCOLOG II) 345695-5.1 UNIT/GM-% cream Apply topically every 12 hours as needed Apply topically 4 times daily. Historical Provider, MD   levothyroxine (SYNTHROID) 50 MCG tablet Take 50 mcg by mouth Daily    Historical Provider, MD   timolol (TIMOPTIC) 0.5 % ophthalmic solution 1 drop 2 times daily    Historical Provider, MD   brimonidine (ALPHAGAN) 0.2 % ophthalmic solution 1 drop 3 times daily    Historical Provider, MD   insulin detemir (LEVEMIR) 100 UNIT/ML injection vial Inject 12 Units into the skin nightly. Historical Provider, MD   acetaminophen (TYLENOL) 325 MG tablet Take 650 mg by mouth every 6 hours as needed for Pain. Historical Provider, MD   docusate sodium (COLACE) 100 MG capsule Take 100 mg by mouth 2 times daily. Historical Provider, MD   bisacodyl (DULCOLAX) 10 MG suppository Place 10 mg rectally daily. Historical Provider, MD   bisacodyl (DULCOLAX) 5 MG EC tablet Take 5 mg by mouth daily as needed for Constipation. Historical Provider, MD   latanoprost (XALATAN) 0.005 % ophthalmic solution Place 1 drop into both eyes nightly.     Historical Provider, MD   levETIRAcetam (KEPPRA) 500 MG tablet Take 1 tablet by mouth 2 times daily. 10/23/14   Beba Jones MD   atorvastatin (LIPITOR) 20 MG tablet TAKE 1 TABLET BY MOUTH DAILY. 9/11/13   Jason Flanagan APRN - CNP   aspirin 81 MG tablet Take 1 tablet by mouth daily. 4/10/13   Jason Flanagan, APRN - CNP       Social history:  reports that she quit smoking about 27 years ago. She has never used smokeless tobacco. She reports that she does not drink alcohol and does not use drugs. Family history:  History reviewed. No pertinent family history. ROS  Review of Systems   Unable to perform ROS: Dementia       Exam  ED Triage Vitals [11/21/22 0943]   BP Temp Temp Source Heart Rate Resp SpO2 Height Weight   (!) 176/60 98.4 °F (36.9 °C) Oral 55 18 100 % -- --       Physical Exam  Vitals and nursing note reviewed. Constitutional:       General: She is not in acute distress. Appearance: She is well-developed. HENT:      Head: Normocephalic and atraumatic. Nose: Nose normal. No congestion. Mouth/Throat:      Mouth: Mucous membranes are moist.      Pharynx: Oropharynx is clear. Eyes:      General: No scleral icterus. Extraocular Movements: Extraocular movements intact. Conjunctiva/sclera: Conjunctivae normal.      Pupils: Pupils are equal, round, and reactive to light. Cardiovascular:      Rate and Rhythm: Normal rate and regular rhythm. Heart sounds: No murmur heard. Comments: Equal radial pulses, low pitched pansystolic murmur noted  Pulmonary:      Effort: Pulmonary effort is normal.      Breath sounds: Normal breath sounds. Comments: No chest wall tenderness or crepitus  Abdominal:      General: There is no distension. Palpations: Abdomen is soft. Tenderness: There is no abdominal tenderness. Musculoskeletal:         General: No deformity. Normal range of motion. Cervical back: Normal range of motion and neck supple.       Comments: No midline tenderness of spine no step-offs abrasions hematomas or objective evidence of trauma. Ranging all extremities equally, no deformities noted, neurovascularly intact extremities. Skin:     General: Skin is warm. Findings: No rash. Neurological:      Mental Status: She is alert and oriented to person, place, and time. Motor: No abnormal muscle tone. Coordination: Coordination normal.   Psychiatric:         Mood and Affect: Mood normal.         Behavior: Behavior normal.         ED Course    ED Medication Orders (From admission, onward)      Start Ordered     Status Ordering Provider    11/21/22 1030 11/21/22 1009  aspirin chewable tablet 162 mg  ONCE         Last MAR action: Given - by Dennie Fleet on 11/21/22 at 1027 MICHELLE OLIVO          Initial EKG with significant baseline artifact repeat shows  EKG  Rhythm is sinus bradycardia  Rate is 55  Axis is normal  Conduction Abnormalities borderline first-degree AV block, QT prolonged 516 but significantly less than half of the distance tween or complexes  No STEMI criteria  Qtc is 516      Radiology  XR CHEST PORTABLE    Result Date: 11/21/2022  EXAMINATION: ONE XRAY VIEW OF THE CHEST 11/21/2022 10:07 am COMPARISON: None. HISTORY: ORDERING SYSTEM PROVIDED HISTORY: CP TECHNOLOGIST PROVIDED HISTORY: Reason for exam:->CP Reason for Exam: pt not a good historian, per chart CP FINDINGS: Normal cardiomediastinal silhouette. Status post median sternotomy. No acute airspace infiltrate. No pneumothorax or pleural effusion     No acute cardiopulmonary findings      Bedside Ultrasound  No results found.        Labs  Results for orders placed or performed during the hospital encounter of 11/21/22   CBC with Auto Differential   Result Value Ref Range    WBC 6.0 4.0 - 11.0 K/uL    RBC 3.63 (L) 4.00 - 5.20 M/uL    Hemoglobin 11.1 (L) 12.0 - 16.0 g/dL    Hematocrit 33.3 (L) 36.0 - 48.0 %    MCV 91.7 80.0 - 100.0 fL    MCH 30.6 26.0 - 34.0 pg    MCHC 33.4 31.0 - 36.0 g/dL    RDW 13.5 12.4 - 15.4 %    Platelets 841 681 - 487 K/uL    MPV 7.3 5.0 - 10.5 fL    Neutrophils % 50.2 %    Lymphocytes % 35.9 %    Monocytes % 7.0 %    Eosinophils % 6.2 %    Basophils % 0.7 %    Neutrophils Absolute 3.0 1.7 - 7.7 K/uL    Lymphocytes Absolute 2.1 1.0 - 5.1 K/uL    Monocytes Absolute 0.4 0.0 - 1.3 K/uL    Eosinophils Absolute 0.4 0.0 - 0.6 K/uL    Basophils Absolute 0.0 0.0 - 0.2 K/uL   CMP w/ Reflex to MG   Result Value Ref Range    Sodium 144 136 - 145 mmol/L    Potassium reflex Magnesium 4.3 3.5 - 5.1 mmol/L    Chloride 109 99 - 110 mmol/L    CO2 26 21 - 32 mmol/L    Anion Gap 9 3 - 16    Glucose 85 70 - 99 mg/dL    BUN 23 (H) 7 - 20 mg/dL    Creatinine 1.1 0.6 - 1.2 mg/dL    Est, Glom Filt Rate 51 (A) >60    Calcium 8.4 8.3 - 10.6 mg/dL    Total Protein 5.8 (L) 6.4 - 8.2 g/dL    Albumin 3.2 (L) 3.4 - 5.0 g/dL    Albumin/Globulin Ratio 1.2 1.1 - 2.2    Total Bilirubin 0.3 0.0 - 1.0 mg/dL    Alkaline Phosphatase 85 40 - 129 U/L    ALT 13 10 - 40 U/L    AST 18 15 - 37 U/L   Lipase   Result Value Ref Range    Lipase 21.0 13.0 - 60.0 U/L   Troponin   Result Value Ref Range    Troponin 0.01 <0.01 ng/mL   Troponin   Result Value Ref Range    Troponin <0.01 <0.01 ng/mL   EKG 12 Lead   Result Value Ref Range    Ventricular Rate 67 BPM    Atrial Rate 312 BPM    QRS Duration 74 ms    Q-T Interval 502 ms    QTc Calculation (Bazett) 530 ms    R Axis 51 degrees    T Axis 36 degrees    Diagnosis       Accelerated Junctional rhythmProlonged QTAbnormal ECGWhen compared with ECG of 17-SEP-2021 16:02,Junctional rhythm has replaced Sinus rhythmQT has lengthened   EKG 12 Lead   Result Value Ref Range    Ventricular Rate 58 BPM    Atrial Rate 58 BPM    P-R Interval 242 ms    QRS Duration 80 ms    Q-T Interval 526 ms    QTc Calculation (Bazett) 516 ms    R Axis 42 degrees    T Axis 29 degrees    Diagnosis       Sinus bradycardia with sinus arrhythmia with 1st degree A-V blockProlonged QTAbnormal ECGWhen compared with ECG of 21-NOV-2022 09:46, (unconfirmed)Sinus rhythm has replaced Junctional rhythmNonspecific T wave abnormality, improved in Lateral leads         Procedures  Procedures      MDM  Patient seen and evaluated. Relevant records reviewed. - Patient is [de-identified] y.o. female presented for reported episode of chest pain now apparently resolved  - Exam showed well-appearing elderly female pleasantly confused no acute distress vitals overall reassuring. Unremarkable and overall reassuring physical exam  - Workup here has been overall reassuring. She has no clear acute pattern on EKG and serial troponins are negative. No metabolic derangements no acute process identified on chest x-ray. While patient does have a history of coronary artery disease and would be increased risk by calculated heart score, story is questionable and given her CODE STATUS DNR CCA and overall goals of care and her dementia I feel that the risk of admission likely outweighs the benefit. I did call and have conversation with patient's power of  her son, who agrees would like to avoid hospitalization if at all possible as she would likely not be a candidate for any cardiac cath or surgery or other aggressive interventions. Patient monitored for several hours in the emergency department no developing symptoms.    - I have a low concern for  other emergent process, and do not see indication for further work-up in the ER, as it is unlikely  and poses more risk than benefit. - I discussed the results, including any incidental findings, with patient and family member patient and son. Questions answered. We agreed to d/c. Patient/family agreeable to plan and express understanding of plan. Clinical Impression:  1. Atypical chest pain          Disposition:  Discharge to nursing home in fair condition.     Blood pressure (!) 157/43, pulse 56, temperature 98.4 °F (36.9 °C), temperature source Oral, resp. rate 18, SpO2 98 %, not currently breastfeeding. Patient was given scripts for the following medications. I counseled patient how to take these medications. New Prescriptions    No medications on file       Disposition referral (if applicable):  Tomi Green MD  [address] [place] [address]   [phone]    Schedule an appointment as soon as possible for a visit       I, [address], am the primary attending of record and contributed the majority of evaluation and treatment of emergent care for this encounter. Total critical care time is 0 minutes, which excludes separately billable procedures and updating family. Time spent is specifically for management of the presenting complaint and symptoms initially, direct bedside care, reevaluation, review of records, and consultation. There was a high probability of clinically significant life-threatening deterioration in the patient's condition, which required my urgent intervention. This chart was generated in part by using Dragon Dictation system and may contain errors related to that system including errors in grammar, punctuation, and spelling, as well as words and phrases that may be inappropriate. If there are any questions or concerns please feel free to contact the dictating provider for clarification.      MD Ari Strickland MD  11/21/22 9211

## 2024-05-19 ENCOUNTER — APPOINTMENT (OUTPATIENT)
Dept: CT IMAGING | Age: 84
DRG: 178 | End: 2024-05-19
Payer: MEDICARE

## 2024-05-19 ENCOUNTER — APPOINTMENT (OUTPATIENT)
Dept: GENERAL RADIOLOGY | Age: 84
DRG: 178 | End: 2024-05-19
Payer: MEDICARE

## 2024-05-19 ENCOUNTER — HOSPITAL ENCOUNTER (INPATIENT)
Age: 84
LOS: 4 days | Discharge: HOME | DRG: 178 | End: 2024-05-23
Attending: EMERGENCY MEDICINE | Admitting: INTERNAL MEDICINE
Payer: MEDICARE

## 2024-05-19 DIAGNOSIS — J18.9 MULTIFOCAL PNEUMONIA: Primary | ICD-10-CM

## 2024-05-19 LAB
ALBUMIN SERPL-MCNC: 2.7 G/DL (ref 3.4–5)
ALBUMIN/GLOB SERPL: 0.8 {RATIO} (ref 1.1–2.2)
ALP SERPL-CCNC: 109 U/L (ref 40–129)
ALT SERPL-CCNC: 25 U/L (ref 10–40)
ANION GAP SERPL CALCULATED.3IONS-SCNC: 14 MMOL/L (ref 3–16)
AST SERPL-CCNC: 38 U/L (ref 15–37)
BASOPHILS # BLD: 0 K/UL (ref 0–0.2)
BASOPHILS NFR BLD: 0 %
BILIRUB SERPL-MCNC: 0.4 MG/DL (ref 0–1)
BUN SERPL-MCNC: 37 MG/DL (ref 7–20)
BURR CELLS BLD QL SMEAR: ABNORMAL
CALCIUM SERPL-MCNC: 8 MG/DL (ref 8.3–10.6)
CHLORIDE SERPL-SCNC: 103 MMOL/L (ref 99–110)
CO2 SERPL-SCNC: 19 MMOL/L (ref 21–32)
CREAT SERPL-MCNC: 1.7 MG/DL (ref 0.6–1.2)
DACRYOCYTES BLD QL SMEAR: ABNORMAL
DEPRECATED RDW RBC AUTO: 14.2 % (ref 12.4–15.4)
EKG ATRIAL RATE: 74 BPM
EKG DIAGNOSIS: NORMAL
EKG P-R INTERVAL: 184 MS
EKG Q-T INTERVAL: 450 MS
EKG QRS DURATION: 80 MS
EKG QTC CALCULATION (BAZETT): 499 MS
EKG R AXIS: 60 DEGREES
EKG T AXIS: 60 DEGREES
EKG VENTRICULAR RATE: 74 BPM
EOSINOPHIL # BLD: 0.2 K/UL (ref 0–0.6)
EOSINOPHIL NFR BLD: 1 %
GFR SERPLBLD CREATININE-BSD FMLA CKD-EPI: 30 ML/MIN/{1.73_M2}
GLUCOSE BLD-MCNC: 87 MG/DL (ref 70–99)
GLUCOSE SERPL-MCNC: 118 MG/DL (ref 70–99)
HCT VFR BLD AUTO: 28.8 % (ref 36–48)
HGB BLD-MCNC: 9.6 G/DL (ref 12–16)
INR PPP: 1.13 (ref 0.85–1.15)
LACTATE BLDV-SCNC: 0.7 MMOL/L (ref 0.4–1.9)
LYMPHOCYTES # BLD: 2.5 K/UL (ref 1–5.1)
LYMPHOCYTES NFR BLD: 16 %
MCH RBC QN AUTO: 31.1 PG (ref 26–34)
MCHC RBC AUTO-ENTMCNC: 33.4 G/DL (ref 31–36)
MCV RBC AUTO: 93 FL (ref 80–100)
MONOCYTES # BLD: 0.8 K/UL (ref 0–1.3)
MONOCYTES NFR BLD: 5 %
NEUTROPHILS # BLD: 12.2 K/UL (ref 1.7–7.7)
NEUTROPHILS NFR BLD: 78 %
NT-PROBNP SERPL-MCNC: ABNORMAL PG/ML (ref 0–449)
PERFORMED ON: NORMAL
PLATELET # BLD AUTO: 340 K/UL (ref 135–450)
PLATELET BLD QL SMEAR: ADEQUATE
PMV BLD AUTO: 7.9 FL (ref 5–10.5)
POIKILOCYTOSIS BLD QL SMEAR: ABNORMAL
POTASSIUM SERPL-SCNC: 4.6 MMOL/L (ref 3.5–5.1)
PROT SERPL-MCNC: 6.2 G/DL (ref 6.4–8.2)
PROTHROMBIN TIME: 14.7 SEC (ref 11.9–14.9)
RBC # BLD AUTO: 3.1 M/UL (ref 4–5.2)
REASON FOR REJECTION: NORMAL
REJECTED TEST: NORMAL
SARS-COV-2 RDRP RESP QL NAA+PROBE: NOT DETECTED
SLIDE REVIEW: ABNORMAL
SODIUM SERPL-SCNC: 136 MMOL/L (ref 136–145)
TROPONIN, HIGH SENSITIVITY: 55 NG/L (ref 0–14)
WBC # BLD AUTO: 15.6 K/UL (ref 4–11)

## 2024-05-19 PROCEDURE — 71045 X-RAY EXAM CHEST 1 VIEW: CPT

## 2024-05-19 PROCEDURE — 80053 COMPREHEN METABOLIC PANEL: CPT

## 2024-05-19 PROCEDURE — 6360000002 HC RX W HCPCS: Performed by: NURSE PRACTITIONER

## 2024-05-19 PROCEDURE — 96365 THER/PROPH/DIAG IV INF INIT: CPT

## 2024-05-19 PROCEDURE — 96366 THER/PROPH/DIAG IV INF ADDON: CPT

## 2024-05-19 PROCEDURE — 6360000002 HC RX W HCPCS: Performed by: INTERNAL MEDICINE

## 2024-05-19 PROCEDURE — 84484 ASSAY OF TROPONIN QUANT: CPT

## 2024-05-19 PROCEDURE — 96368 THER/DIAG CONCURRENT INF: CPT

## 2024-05-19 PROCEDURE — 70450 CT HEAD/BRAIN W/O DYE: CPT

## 2024-05-19 PROCEDURE — 2580000003 HC RX 258: Performed by: NURSE PRACTITIONER

## 2024-05-19 PROCEDURE — 93010 ELECTROCARDIOGRAM REPORT: CPT | Performed by: INTERNAL MEDICINE

## 2024-05-19 PROCEDURE — 6370000000 HC RX 637 (ALT 250 FOR IP): Performed by: INTERNAL MEDICINE

## 2024-05-19 PROCEDURE — 99285 EMERGENCY DEPT VISIT HI MDM: CPT

## 2024-05-19 PROCEDURE — 85025 COMPLETE CBC W/AUTO DIFF WBC: CPT

## 2024-05-19 PROCEDURE — 85610 PROTHROMBIN TIME: CPT

## 2024-05-19 PROCEDURE — 93005 ELECTROCARDIOGRAM TRACING: CPT | Performed by: NURSE PRACTITIONER

## 2024-05-19 PROCEDURE — 87635 SARS-COV-2 COVID-19 AMP PRB: CPT

## 2024-05-19 PROCEDURE — 71250 CT THORAX DX C-: CPT

## 2024-05-19 PROCEDURE — 87040 BLOOD CULTURE FOR BACTERIA: CPT

## 2024-05-19 PROCEDURE — 2580000003 HC RX 258: Performed by: INTERNAL MEDICINE

## 2024-05-19 PROCEDURE — 36415 COLL VENOUS BLD VENIPUNCTURE: CPT

## 2024-05-19 PROCEDURE — 83605 ASSAY OF LACTIC ACID: CPT

## 2024-05-19 PROCEDURE — 1200000000 HC SEMI PRIVATE

## 2024-05-19 PROCEDURE — 83880 ASSAY OF NATRIURETIC PEPTIDE: CPT

## 2024-05-19 RX ORDER — ASPIRIN 81 MG/1
81 TABLET, CHEWABLE ORAL DAILY
Status: DISCONTINUED | OUTPATIENT
Start: 2024-05-19 | End: 2024-05-23 | Stop reason: HOSPADM

## 2024-05-19 RX ORDER — MIRTAZAPINE 15 MG/1
15 TABLET, FILM COATED ORAL NIGHTLY
Status: DISCONTINUED | OUTPATIENT
Start: 2024-05-19 | End: 2024-05-23 | Stop reason: HOSPADM

## 2024-05-19 RX ORDER — LEVOTHYROXINE SODIUM 0.05 MG/1
50 TABLET ORAL
Status: DISCONTINUED | OUTPATIENT
Start: 2024-05-20 | End: 2024-05-23 | Stop reason: HOSPADM

## 2024-05-19 RX ORDER — AMLODIPINE BESYLATE 5 MG/1
5 TABLET ORAL DAILY
Status: DISCONTINUED | OUTPATIENT
Start: 2024-05-19 | End: 2024-05-23 | Stop reason: HOSPADM

## 2024-05-19 RX ORDER — GLUCAGON 1 MG/ML
1 KIT INJECTION PRN
Status: DISCONTINUED | OUTPATIENT
Start: 2024-05-19 | End: 2024-05-23 | Stop reason: HOSPADM

## 2024-05-19 RX ORDER — LATANOPROST 50 UG/ML
1 SOLUTION/ DROPS OPHTHALMIC NIGHTLY
Status: DISCONTINUED | OUTPATIENT
Start: 2024-05-19 | End: 2024-05-23 | Stop reason: HOSPADM

## 2024-05-19 RX ORDER — ACETAMINOPHEN 325 MG/1
650 TABLET ORAL EVERY 6 HOURS PRN
Status: DISCONTINUED | OUTPATIENT
Start: 2024-05-19 | End: 2024-05-23 | Stop reason: HOSPADM

## 2024-05-19 RX ORDER — NITROGLYCERIN 0.4 MG/1
0.4 TABLET SUBLINGUAL EVERY 5 MIN PRN
Status: DISCONTINUED | OUTPATIENT
Start: 2024-05-19 | End: 2024-05-23 | Stop reason: HOSPADM

## 2024-05-19 RX ORDER — AMLODIPINE BESYLATE 5 MG/1
5 TABLET ORAL DAILY
COMMUNITY

## 2024-05-19 RX ORDER — ENOXAPARIN SODIUM 100 MG/ML
30 INJECTION SUBCUTANEOUS DAILY
Status: DISCONTINUED | OUTPATIENT
Start: 2024-05-19 | End: 2024-05-19 | Stop reason: DRUGHIGH

## 2024-05-19 RX ORDER — NITROGLYCERIN 0.4 MG/1
0.4 TABLET SUBLINGUAL EVERY 5 MIN PRN
COMMUNITY

## 2024-05-19 RX ORDER — CLOTRIMAZOLE AND BETAMETHASONE DIPROPIONATE 10; .64 MG/G; MG/G
1 CREAM TOPICAL 2 TIMES DAILY PRN
COMMUNITY

## 2024-05-19 RX ORDER — DOCUSATE SODIUM 100 MG/1
100 CAPSULE, LIQUID FILLED ORAL DAILY PRN
Status: DISCONTINUED | OUTPATIENT
Start: 2024-05-19 | End: 2024-05-22 | Stop reason: SDUPTHER

## 2024-05-19 RX ORDER — LEVETIRACETAM 250 MG/1
250 TABLET ORAL 2 TIMES DAILY
Status: DISCONTINUED | OUTPATIENT
Start: 2024-05-19 | End: 2024-05-23 | Stop reason: HOSPADM

## 2024-05-19 RX ORDER — DORZOLAMIDE HCL 20 MG/ML
1 SOLUTION/ DROPS OPHTHALMIC 2 TIMES DAILY
COMMUNITY

## 2024-05-19 RX ORDER — ESCITALOPRAM OXALATE 10 MG/1
20 TABLET ORAL DAILY
Status: DISCONTINUED | OUTPATIENT
Start: 2024-05-20 | End: 2024-05-23 | Stop reason: HOSPADM

## 2024-05-19 RX ORDER — ONDANSETRON 4 MG/1
4 TABLET, ORALLY DISINTEGRATING ORAL EVERY 8 HOURS PRN
Status: DISCONTINUED | OUTPATIENT
Start: 2024-05-19 | End: 2024-05-19 | Stop reason: SDUPTHER

## 2024-05-19 RX ORDER — INSULIN LISPRO 100 [IU]/ML
0-4 INJECTION, SOLUTION INTRAVENOUS; SUBCUTANEOUS
Status: DISCONTINUED | OUTPATIENT
Start: 2024-05-20 | End: 2024-05-23 | Stop reason: HOSPADM

## 2024-05-19 RX ORDER — BRIMONIDINE TARTRATE 2 MG/ML
1 SOLUTION/ DROPS OPHTHALMIC 2 TIMES DAILY
Status: DISCONTINUED | OUTPATIENT
Start: 2024-05-19 | End: 2024-05-23 | Stop reason: HOSPADM

## 2024-05-19 RX ORDER — CIPROFLOXACIN AND DEXAMETHASONE 3; 1 MG/ML; MG/ML
3 SUSPENSION/ DROPS AURICULAR (OTIC) 3 TIMES DAILY
Status: ON HOLD | COMMUNITY
End: 2024-05-22 | Stop reason: ALTCHOICE

## 2024-05-19 RX ORDER — BRIMONIDINE TARTRATE AND TIMOLOL MALEATE 2; 5 MG/ML; MG/ML
1 SOLUTION OPHTHALMIC EVERY 12 HOURS
COMMUNITY

## 2024-05-19 RX ORDER — CIPROFLOXACIN AND DEXAMETHASONE 3; 1 MG/ML; MG/ML
3 SUSPENSION/ DROPS AURICULAR (OTIC) 3 TIMES DAILY
Status: DISPENSED | OUTPATIENT
Start: 2024-05-19 | End: 2024-05-22

## 2024-05-19 RX ORDER — LACTOBACILLUS RHAMNOSUS GG 10B CELL
1 CAPSULE ORAL 2 TIMES DAILY
Status: DISCONTINUED | OUTPATIENT
Start: 2024-05-19 | End: 2024-05-23 | Stop reason: HOSPADM

## 2024-05-19 RX ORDER — TIMOLOL MALEATE 5 MG/ML
1 SOLUTION/ DROPS OPHTHALMIC 2 TIMES DAILY
Status: DISCONTINUED | OUTPATIENT
Start: 2024-05-19 | End: 2024-05-23 | Stop reason: HOSPADM

## 2024-05-19 RX ORDER — ONDANSETRON 2 MG/ML
4 INJECTION INTRAMUSCULAR; INTRAVENOUS EVERY 6 HOURS PRN
Status: DISCONTINUED | OUTPATIENT
Start: 2024-05-19 | End: 2024-05-23 | Stop reason: HOSPADM

## 2024-05-19 RX ORDER — ONDANSETRON 4 MG/1
4 TABLET, ORALLY DISINTEGRATING ORAL EVERY 8 HOURS PRN
Status: DISCONTINUED | OUTPATIENT
Start: 2024-05-19 | End: 2024-05-23 | Stop reason: HOSPADM

## 2024-05-19 RX ORDER — POLYETHYLENE GLYCOL 3350 17 G/17G
17 POWDER, FOR SOLUTION ORAL DAILY PRN
Status: DISCONTINUED | OUTPATIENT
Start: 2024-05-19 | End: 2024-05-23 | Stop reason: HOSPADM

## 2024-05-19 RX ORDER — INSULIN LISPRO 100 [IU]/ML
0-4 INJECTION, SOLUTION INTRAVENOUS; SUBCUTANEOUS NIGHTLY
Status: DISCONTINUED | OUTPATIENT
Start: 2024-05-19 | End: 2024-05-23 | Stop reason: HOSPADM

## 2024-05-19 RX ORDER — SODIUM CHLORIDE 0.9 % (FLUSH) 0.9 %
5-40 SYRINGE (ML) INJECTION EVERY 12 HOURS SCHEDULED
Status: DISCONTINUED | OUTPATIENT
Start: 2024-05-19 | End: 2024-05-23 | Stop reason: HOSPADM

## 2024-05-19 RX ORDER — DEXTROSE MONOHYDRATE 100 MG/ML
INJECTION, SOLUTION INTRAVENOUS CONTINUOUS PRN
Status: DISCONTINUED | OUTPATIENT
Start: 2024-05-19 | End: 2024-05-23 | Stop reason: HOSPADM

## 2024-05-19 RX ORDER — BISACODYL 10 MG
10 SUPPOSITORY, RECTAL RECTAL DAILY PRN
Status: DISCONTINUED | OUTPATIENT
Start: 2024-05-19 | End: 2024-05-22 | Stop reason: SDUPTHER

## 2024-05-19 RX ORDER — ONDANSETRON 4 MG/1
4 TABLET, ORALLY DISINTEGRATING ORAL EVERY 8 HOURS PRN
COMMUNITY

## 2024-05-19 RX ORDER — ACETAMINOPHEN 325 MG/1
650 TABLET ORAL EVERY 6 HOURS PRN
Status: DISCONTINUED | OUTPATIENT
Start: 2024-05-19 | End: 2024-05-19 | Stop reason: SDUPTHER

## 2024-05-19 RX ORDER — DORZOLAMIDE HCL 20 MG/ML
1 SOLUTION/ DROPS OPHTHALMIC 2 TIMES DAILY
Status: DISCONTINUED | OUTPATIENT
Start: 2024-05-19 | End: 2024-05-23 | Stop reason: HOSPADM

## 2024-05-19 RX ORDER — POTASSIUM CHLORIDE 750 MG/1
10 TABLET, EXTENDED RELEASE ORAL DAILY
COMMUNITY

## 2024-05-19 RX ORDER — INSULIN GLARGINE 100 [IU]/ML
6 INJECTION, SOLUTION SUBCUTANEOUS NIGHTLY
COMMUNITY

## 2024-05-19 RX ORDER — LANOLIN ALCOHOL/MO/W.PET/CERES
6 CREAM (GRAM) TOPICAL
Status: DISCONTINUED | OUTPATIENT
Start: 2024-05-19 | End: 2024-05-23 | Stop reason: HOSPADM

## 2024-05-19 RX ORDER — BRIMONIDINE TARTRATE AND TIMOLOL MALEATE 2; 5 MG/ML; MG/ML
1 SOLUTION OPHTHALMIC EVERY 12 HOURS
Status: DISCONTINUED | OUTPATIENT
Start: 2024-05-19 | End: 2024-05-19 | Stop reason: CLARIF

## 2024-05-19 RX ORDER — POTASSIUM CHLORIDE 750 MG/1
10 TABLET, EXTENDED RELEASE ORAL DAILY
Status: DISCONTINUED | OUTPATIENT
Start: 2024-05-19 | End: 2024-05-23 | Stop reason: HOSPADM

## 2024-05-19 RX ORDER — SODIUM CHLORIDE 9 MG/ML
INJECTION, SOLUTION INTRAVENOUS CONTINUOUS
Status: DISCONTINUED | OUTPATIENT
Start: 2024-05-19 | End: 2024-05-21

## 2024-05-19 RX ORDER — ATORVASTATIN CALCIUM 10 MG/1
20 TABLET, FILM COATED ORAL
Status: DISCONTINUED | OUTPATIENT
Start: 2024-05-19 | End: 2024-05-23 | Stop reason: HOSPADM

## 2024-05-19 RX ORDER — DIPHENHYDRAMINE HYDROCHLORIDE 50 MG/ML
25 INJECTION INTRAMUSCULAR; INTRAVENOUS ONCE
Status: DISCONTINUED | OUTPATIENT
Start: 2024-05-19 | End: 2024-05-19

## 2024-05-19 RX ORDER — ENOXAPARIN SODIUM 100 MG/ML
60 INJECTION SUBCUTANEOUS 2 TIMES DAILY
Status: DISCONTINUED | OUTPATIENT
Start: 2024-05-19 | End: 2024-05-20

## 2024-05-19 RX ORDER — ACETAMINOPHEN 650 MG/1
650 SUPPOSITORY RECTAL EVERY 6 HOURS PRN
Status: DISCONTINUED | OUTPATIENT
Start: 2024-05-19 | End: 2024-05-23 | Stop reason: HOSPADM

## 2024-05-19 RX ORDER — UREA 10 %
1 LOTION (ML) TOPICAL 2 TIMES DAILY
Status: DISCONTINUED | OUTPATIENT
Start: 2024-05-19 | End: 2024-05-19 | Stop reason: SDUPTHER

## 2024-05-19 RX ORDER — SODIUM CHLORIDE 0.9 % (FLUSH) 0.9 %
5-40 SYRINGE (ML) INJECTION PRN
Status: DISCONTINUED | OUTPATIENT
Start: 2024-05-19 | End: 2024-05-23 | Stop reason: HOSPADM

## 2024-05-19 RX ORDER — INSULIN GLARGINE 100 [IU]/ML
6 INJECTION, SOLUTION SUBCUTANEOUS NIGHTLY
Status: DISCONTINUED | OUTPATIENT
Start: 2024-05-19 | End: 2024-05-23 | Stop reason: HOSPADM

## 2024-05-19 RX ORDER — SODIUM CHLORIDE 9 MG/ML
INJECTION, SOLUTION INTRAVENOUS PRN
Status: DISCONTINUED | OUTPATIENT
Start: 2024-05-19 | End: 2024-05-23 | Stop reason: HOSPADM

## 2024-05-19 RX ADMIN — BRIMONIDINE TARTRATE 1 DROP: 2 SOLUTION/ DROPS OPHTHALMIC at 22:32

## 2024-05-19 RX ADMIN — CIPROFLOXACIN AND DEXAMETHASONE 3 DROP: 3; 1 SUSPENSION/ DROPS AURICULAR (OTIC) at 22:30

## 2024-05-19 RX ADMIN — CEFEPIME 2000 MG: 2 INJECTION, POWDER, FOR SOLUTION INTRAVENOUS at 13:50

## 2024-05-19 RX ADMIN — SODIUM CHLORIDE: 9 INJECTION, SOLUTION INTRAVENOUS at 22:34

## 2024-05-19 RX ADMIN — TIMOLOL MALEATE 1 DROP: 5 SOLUTION OPHTHALMIC at 22:31

## 2024-05-19 RX ADMIN — ENOXAPARIN SODIUM 60 MG: 100 INJECTION SUBCUTANEOUS at 22:30

## 2024-05-19 RX ADMIN — DORZOLAMIDE HYDROCHLORIDE 1 DROP: 20 SOLUTION/ DROPS OPHTHALMIC at 22:32

## 2024-05-19 RX ADMIN — VANCOMYCIN HYDROCHLORIDE 1500 MG: 10 INJECTION, POWDER, LYOPHILIZED, FOR SOLUTION INTRAVENOUS at 15:00

## 2024-05-19 RX ADMIN — Medication 10 ML: at 22:35

## 2024-05-19 RX ADMIN — LATANOPROST 1 DROP: 50 SOLUTION OPHTHALMIC at 22:33

## 2024-05-19 ASSESSMENT — PAIN - FUNCTIONAL ASSESSMENT: PAIN_FUNCTIONAL_ASSESSMENT: NONE - DENIES PAIN

## 2024-05-19 NOTE — ED NOTES
Call placed to the [facility] for patient care report, spoke with Lavinia GARCIA, she states the patient is less verbal today and has a cough, with reported blood on the patient's pillow. No other information obtained. Provider notified.

## 2024-05-19 NOTE — ED PROVIDER NOTES
In addition to the advanced practice provider, I personally saw Maya Morales and performed a substantive portion of the visit including all aspects of the medical decision making.    Medical Decision Making  Patient seen and evaluated at bedside.  Briefly, patient is presenting with cough that staff at [facility] reports was productive of blood-tinged sputum.  Lab workup is significant for leukocytosis, elevated BNP and acute kidney injury.  Chest x-ray significant for right lower lobe pneumonia.  Patient started on IV cefepime and vancomycin in the ED. Due to worsening altered mental status, CT head was ordered.  In addition, CT chest will be ordered for further evaluation of pneumonia.  Patient will not be given contrast given her significant acute kidney injury.  Patient will be admitted for continued treatment with IV antibiotics for community-acquired pneumonia.    EKG  Sinus rhythm with no acute ST elevations.  Occasional premature ventricular complexes.  Ventricular rate of 74 bpm.  Normal axis.  QTc of 499.    SEP-1  Is this patient to be included in the SEP-1 Core Measure due to severe sepsis or septic shock?   No   Exclusion criteria - the patient is NOT to be included for SEP-1 Core Measure due to:  May have criteria for sepsis, but does not meet criteria for severe sepsis or septic shock    Screenings     [place] Coma Scale  Eye Opening: Spontaneous  Best Verbal Response: Incomprehensible speech  Best Motor Response: Obeys commands  [place] Coma Scale Score: 12             Patient Referrals:  No follow-up provider specified.    Discharge Medications:  New Prescriptions    No medications on file       FINAL IMPRESSION  No diagnosis found.    Blood pressure (!) 158/42, pulse 64, temperature 98.5 °F (36.9 °C), temperature source Oral, resp. rate 20, height 1.575 m (5' 2\"), weight 74.8 kg (165 lb), SpO2 92 %, not currently breastfeeding.     I personally saw the patient and made/approved the

## 2024-05-19 NOTE — ED PROVIDER NOTES
[facility] ED  EMERGENCY DEPARTMENT ENCOUNTER        Pt Name: Maya Morales  MRN: [id number]  Birthdate 1940  Date of evaluation: 2024  Provider: LATANYA Cook - CNP  PCP: Julio Valenzuela MD  Note Started: 12:05 PM EDT 24      DENISA. I have evaluated this patient.        CHIEF COMPLAINT       Chief Complaint   Patient presents with    Hemoptysis     Patient arrives from NH with reports of coughing up blood, patient states that she hurts all over.       HISTORY OF PRESENT ILLNESS: 1 or more Elements     History From: Patient     Limitations to history : None    Social Determinants Significantly Affecting Health : None    Chief Complaint: Cough     Maya Morales is a 83 y.o. female who presents to the emergency department today with symptoms of cough.  Patient presents from local nursing home with DNR CCA status.  She has a history of dementia history of Parkinson's disease.  She is able to answer some basic questions for me.  She otherwise is confused.  Reported that she has had episodes of cough and productive sputum with some blood-tinged sputum.  No bleeding here in the emerged department.  She does have some very crusted blood on the left nare.  No posterior pharynx bleeding.  No blood in her mouth.  On exam she denies any chest pain or shortness of breath.  Abdominal exam is unremarkable for any pain.     Nursing Notes were all reviewed and agreed with or any disagreements were addressed in the HPI.    REVIEW OF SYSTEMS :      Review of Systems    Positives and Pertinent negatives as per HPI.     SURGICAL HISTORY     Past Surgical History:   Procedure Laterality Date     SECTION      CHOLECYSTECTOMY      CORONARY ARTERY BYPASS GRAFT  [id number]    FRACTURE SURGERY Left     HIP SURGERY  1/25/15    right hip pinning    HYSTERECTOMY (CERVIX STATUS UNKNOWN)      ROTATOR CUFF REPAIR         CURRENTMEDICATIONS       Previous Medications    ACETAMINOPHEN

## 2024-05-20 PROBLEM — R04.2 HEMOPTYSIS: Status: ACTIVE | Noted: 2024-05-20

## 2024-05-20 PROBLEM — F03.90 DEMENTIA (HCC): Status: ACTIVE | Noted: 2024-05-20

## 2024-05-20 PROBLEM — N18.32 STAGE 3B CHRONIC KIDNEY DISEASE (HCC): Status: ACTIVE | Noted: 2024-05-20

## 2024-05-20 PROBLEM — J18.9 MULTIFOCAL PNEUMONIA: Status: ACTIVE | Noted: 2024-05-20

## 2024-05-20 PROBLEM — G20.A1 PARKINSON'S DISEASE (HCC): Status: ACTIVE | Noted: 2024-05-20

## 2024-05-20 LAB
ALBUMIN SERPL-MCNC: 2.5 G/DL (ref 3.4–5)
ALBUMIN/GLOB SERPL: 0.6 {RATIO} (ref 1.1–2.2)
ALP SERPL-CCNC: 115 U/L (ref 40–129)
ALT SERPL-CCNC: 37 U/L (ref 10–40)
ANION GAP SERPL CALCULATED.3IONS-SCNC: 14 MMOL/L (ref 3–16)
AST SERPL-CCNC: 78 U/L (ref 15–37)
BASOPHILS # BLD: 0 K/UL (ref 0–0.2)
BASOPHILS NFR BLD: 0.1 %
BILIRUB SERPL-MCNC: 0.3 MG/DL (ref 0–1)
BUN SERPL-MCNC: 35 MG/DL (ref 7–20)
CALCIUM SERPL-MCNC: 7.9 MG/DL (ref 8.3–10.6)
CHLORIDE SERPL-SCNC: 107 MMOL/L (ref 99–110)
CO2 SERPL-SCNC: 16 MMOL/L (ref 21–32)
CREAT SERPL-MCNC: 1.5 MG/DL (ref 0.6–1.2)
DEPRECATED RDW RBC AUTO: 13.4 % (ref 12.4–15.4)
EOSINOPHIL # BLD: 0.1 K/UL (ref 0–0.6)
EOSINOPHIL NFR BLD: 0.7 %
GFR SERPLBLD CREATININE-BSD FMLA CKD-EPI: 35 ML/MIN/{1.73_M2}
GLUCOSE BLD-MCNC: 176 MG/DL (ref 70–99)
GLUCOSE BLD-MCNC: 79 MG/DL (ref 70–99)
GLUCOSE BLD-MCNC: 83 MG/DL (ref 70–99)
GLUCOSE BLD-MCNC: 83 MG/DL (ref 70–99)
GLUCOSE BLD-MCNC: 91 MG/DL (ref 70–99)
GLUCOSE SERPL-MCNC: 83 MG/DL (ref 70–99)
HCT VFR BLD AUTO: 28 % (ref 36–48)
HGB BLD-MCNC: 9.2 G/DL (ref 12–16)
LYMPHOCYTES # BLD: 1.6 K/UL (ref 1–5.1)
LYMPHOCYTES NFR BLD: 14 %
MCH RBC QN AUTO: 30.5 PG (ref 26–34)
MCHC RBC AUTO-ENTMCNC: 33 G/DL (ref 31–36)
MCV RBC AUTO: 92.6 FL (ref 80–100)
MONOCYTES # BLD: 1 K/UL (ref 0–1.3)
MONOCYTES NFR BLD: 8.9 %
MRSA DNA SPEC QL NAA+PROBE: NORMAL
NEUTROPHILS # BLD: 8.8 K/UL (ref 1.7–7.7)
NEUTROPHILS NFR BLD: 76.3 %
PERFORMED ON: ABNORMAL
PERFORMED ON: NORMAL
PLATELET # BLD AUTO: 361 K/UL (ref 135–450)
PMV BLD AUTO: 7 FL (ref 5–10.5)
POTASSIUM SERPL-SCNC: 4.5 MMOL/L (ref 3.5–5.1)
PROT SERPL-MCNC: 6.4 G/DL (ref 6.4–8.2)
RBC # BLD AUTO: 3.02 M/UL (ref 4–5.2)
SODIUM SERPL-SCNC: 137 MMOL/L (ref 136–145)
TROPONIN, HIGH SENSITIVITY: 58 NG/L (ref 0–14)
TROPONIN, HIGH SENSITIVITY: 59 NG/L (ref 0–14)
VANCOMYCIN TROUGH SERPL-MCNC: 11.2 UG/ML (ref 10–20)
WBC # BLD AUTO: 11.6 K/UL (ref 4–11)

## 2024-05-20 PROCEDURE — 97530 THERAPEUTIC ACTIVITIES: CPT

## 2024-05-20 PROCEDURE — 1200000000 HC SEMI PRIVATE

## 2024-05-20 PROCEDURE — 80202 ASSAY OF VANCOMYCIN: CPT

## 2024-05-20 PROCEDURE — 84484 ASSAY OF TROPONIN QUANT: CPT

## 2024-05-20 PROCEDURE — 80053 COMPREHEN METABOLIC PANEL: CPT

## 2024-05-20 PROCEDURE — 6360000002 HC RX W HCPCS: Performed by: INTERNAL MEDICINE

## 2024-05-20 PROCEDURE — 6370000000 HC RX 637 (ALT 250 FOR IP): Performed by: INTERNAL MEDICINE

## 2024-05-20 PROCEDURE — 92610 EVALUATE SWALLOWING FUNCTION: CPT

## 2024-05-20 PROCEDURE — 99222 1ST HOSP IP/OBS MODERATE 55: CPT

## 2024-05-20 PROCEDURE — 97166 OT EVAL MOD COMPLEX 45 MIN: CPT

## 2024-05-20 PROCEDURE — 97535 SELF CARE MNGMENT TRAINING: CPT

## 2024-05-20 PROCEDURE — 87641 MR-STAPH DNA AMP PROBE: CPT

## 2024-05-20 PROCEDURE — 85025 COMPLETE CBC W/AUTO DIFF WBC: CPT

## 2024-05-20 PROCEDURE — 6370000000 HC RX 637 (ALT 250 FOR IP)

## 2024-05-20 PROCEDURE — 92526 ORAL FUNCTION THERAPY: CPT

## 2024-05-20 PROCEDURE — 6360000002 HC RX W HCPCS

## 2024-05-20 PROCEDURE — 2580000003 HC RX 258: Performed by: INTERNAL MEDICINE

## 2024-05-20 PROCEDURE — 99231 SBSQ HOSP IP/OBS SF/LOW 25: CPT | Performed by: STUDENT IN AN ORGANIZED HEALTH CARE EDUCATION/TRAINING PROGRAM

## 2024-05-20 PROCEDURE — 36415 COLL VENOUS BLD VENIPUNCTURE: CPT

## 2024-05-20 RX ORDER — LEVETIRACETAM 500 MG/5ML
250 INJECTION, SOLUTION, CONCENTRATE INTRAVENOUS 2 TIMES DAILY
Status: DISCONTINUED | OUTPATIENT
Start: 2024-05-20 | End: 2024-05-21

## 2024-05-20 RX ORDER — ENOXAPARIN SODIUM 100 MG/ML
30 INJECTION SUBCUTANEOUS DAILY
Status: DISCONTINUED | OUTPATIENT
Start: 2024-05-20 | End: 2024-05-22

## 2024-05-20 RX ORDER — GUAIFENESIN/DEXTROMETHORPHAN 100-10MG/5
5 SYRUP ORAL EVERY 4 HOURS PRN
Status: DISCONTINUED | OUTPATIENT
Start: 2024-05-20 | End: 2024-05-23 | Stop reason: HOSPADM

## 2024-05-20 RX ADMIN — BRIMONIDINE TARTRATE 1 DROP: 2 SOLUTION/ DROPS OPHTHALMIC at 21:33

## 2024-05-20 RX ADMIN — TIMOLOL MALEATE 1 DROP: 5 SOLUTION OPHTHALMIC at 08:42

## 2024-05-20 RX ADMIN — TIMOLOL MALEATE 1 DROP: 5 SOLUTION OPHTHALMIC at 21:33

## 2024-05-20 RX ADMIN — DORZOLAMIDE HYDROCHLORIDE 1 DROP: 20 SOLUTION/ DROPS OPHTHALMIC at 21:33

## 2024-05-20 RX ADMIN — LEVETIRACETAM 250 MG: 100 INJECTION, SOLUTION INTRAVENOUS at 21:24

## 2024-05-20 RX ADMIN — CIPROFLOXACIN AND DEXAMETHASONE 3 DROP: 3; 1 SUSPENSION/ DROPS AURICULAR (OTIC) at 08:46

## 2024-05-20 RX ADMIN — CEFEPIME 2000 MG: 2 INJECTION, POWDER, FOR SOLUTION INTRAVENOUS at 02:25

## 2024-05-20 RX ADMIN — CEFEPIME 1000 MG: 1 INJECTION, POWDER, FOR SOLUTION INTRAMUSCULAR; INTRAVENOUS at 15:31

## 2024-05-20 RX ADMIN — ENOXAPARIN SODIUM 30 MG: 100 INJECTION SUBCUTANEOUS at 08:40

## 2024-05-20 RX ADMIN — Medication 6 MG: at 21:29

## 2024-05-20 RX ADMIN — SODIUM CHLORIDE: 9 INJECTION, SOLUTION INTRAVENOUS at 13:37

## 2024-05-20 RX ADMIN — Medication 10 ML: at 21:34

## 2024-05-20 RX ADMIN — MIRTAZAPINE 15 MG: 15 TABLET, FILM COATED ORAL at 21:30

## 2024-05-20 RX ADMIN — CIPROFLOXACIN AND DEXAMETHASONE 3 DROP: 3; 1 SUSPENSION/ DROPS AURICULAR (OTIC) at 15:31

## 2024-05-20 RX ADMIN — DORZOLAMIDE HYDROCHLORIDE 1 DROP: 20 SOLUTION/ DROPS OPHTHALMIC at 08:41

## 2024-05-20 RX ADMIN — Medication 1 CAPSULE: at 21:30

## 2024-05-20 RX ADMIN — ATORVASTATIN CALCIUM 20 MG: 10 TABLET, FILM COATED ORAL at 21:30

## 2024-05-20 RX ADMIN — GUAIFENESIN AND DEXTROMETHORPHAN 5 ML: 100; 10 SYRUP ORAL at 21:19

## 2024-05-20 RX ADMIN — BRIMONIDINE TARTRATE 1 DROP: 2 SOLUTION/ DROPS OPHTHALMIC at 08:42

## 2024-05-20 RX ADMIN — LEVETIRACETAM 250 MG: 100 INJECTION, SOLUTION INTRAVENOUS at 13:09

## 2024-05-20 RX ADMIN — CIPROFLOXACIN AND DEXAMETHASONE 3 DROP: 3; 1 SUSPENSION/ DROPS AURICULAR (OTIC) at 21:28

## 2024-05-20 RX ADMIN — LATANOPROST 1 DROP: 50 SOLUTION OPHTHALMIC at 21:32

## 2024-05-20 RX ADMIN — VANCOMYCIN HYDROCHLORIDE 1000 MG: 1 INJECTION, POWDER, LYOPHILIZED, FOR SOLUTION INTRAVENOUS at 21:23

## 2024-05-20 ASSESSMENT — ENCOUNTER SYMPTOMS
SINUS PRESSURE: 0
SINUS PAIN: 0

## 2024-05-20 NOTE — CONSULTS
[facility]   Pharmacy Pharmacokinetic Monitoring Service - Vancomycin     Maya Morales is a 83 y.o. female starting on vancomycin therapy for HAP x 7 days. Pharmacy consulted by Dr. Moreira for monitoring and adjustment.    Target Concentration: Goal AUC/JAYLIN 400-600 mg*hr/L    Additional Antimicrobials: Cefepime    Pertinent Laboratory Values:   Wt Readings from Last 1 Encounters:   05/19/24 (!) 213.1 kg (469 lb 14.4 oz)     Temp Readings from Last 1 Encounters:   05/19/24 99 °F (37.2 °C) (Oral)     Estimated Creatinine Clearance: 47 mL/min (A) (based on SCr of 1.7 mg/dL (H)).  Recent Labs     05/19/24  1205 05/19/24  1300   CREATININE  --  1.7*   BUN  --  37*   WBC 15.6*  --      Procalcitonin:     Pertinent Cultures:  Culture Date Source Results        MRSA Nasal Swab: was ordered by provider, awaiting results.    Plan:  Dosing recommendations based on Bayesian software  Start vancomycin 1500 mg x 1, then 1000 mg every 24 hours.  Anticipated AUC of 539 and trough concentration of 16 at steady state  Renal labs as indicated   Vancomycin concentration ordered for 5/21 @ 1400.   Pharmacy will continue to monitor patient and adjust therapy as indicated    Thank you for the consult,  Mukesh Birch RPH  5/19/2024 7:49 PM    
Patient med rec completed to reflect the list of meds from the patient's care center ED tubed to pharmacy.  Adarsh Kennedy RPH PharmD 5/19/2024 2:28 PM        
Pharmacy Consult  Per Placido Dunne CNP    RE: Vancomycin    Dx:  HAP    Age = 83  Ht 62 inches  Wt 74.8 kg  BUN/SCR  1.7   Est CrCl  25 ml/min  WBC  15.6           Based on age, wt and renal fxn, dosed at 20 mg/kg (1496 mg) round to 1500 mg IVPB x 1.    AMIRAH CoynePh.5/19/20242:02 PM        
daily as needed for Constipation    Maryann Clemons MD   mirtazapine (REMERON) 15 MG tablet Take 1 tablet by mouth nightly    Maryann Clemons MD   levothyroxine (SYNTHROID) 50 MCG tablet Take 1 tablet by mouth Daily    Maryann Clemons MD   acetaminophen (TYLENOL) 325 MG tablet Take 2 tablets by mouth every 6 hours as needed for Pain    Maryann Clemons MD   docusate sodium (COLACE) 100 MG capsule Take 1 capsule by mouth daily as needed for Constipation    Maryann Clemons MD   bisacodyl (DULCOLAX) 10 MG suppository Place 1 suppository rectally daily as needed for Constipation    ProviderMaryann MD   latanoprost (XALATAN) 0.005 % ophthalmic solution Place 1 drop into both eyes in the morning and at bedtime    Maryann Clemons MD   levETIRAcetam (KEPPRA) 500 MG tablet Take 1 tablet by mouth 2 times daily.  Patient taking differently: Take 0.5 tablets by mouth 2 times daily 10/23/14   Eder Bergeron MD   atorvastatin (LIPITOR) 20 MG tablet TAKE 1 TABLET BY MOUTH DAILY.  Patient taking differently: Take 1 tablet by mouth nightly 9/11/13   Carole Colón APRN - CNP   aspirin 81 MG tablet Take 1 tablet by mouth daily. 4/10/13   Carole Colón APRN - CNP     REVIEW OF SYSTEMS  The following systems were reviewed and revealed the following in addition to any already discussed in the HPI:  Review of Systems   HENT:  Negative for ear discharge, ear pain, sinus pressure and sinus pain.       PHYSICAL EXAM  BP (!) 148/72   Pulse 70   Temp 99.5 °F (37.5 °C) (Oral)   Resp 18   Ht 1.575 m (5' 2\")   Wt 61.1 kg (134 lb 11.2 oz)   SpO2 95%   BMI 24.64 kg/m²   GENERAL: No Acute Distress, Alert and Oriented, no hoarseness  EYES: EOMI, Anti-icteric  NOSE: No epistaxis, nasal mucosa within normal limits, no purulent drainage  EARS: Normal external canal appearance, EAC patent bilaterally, TM's intact bilaterally with no evidence of effusions   FACE: 1/6 House-Brackmann Scale,

## 2024-05-20 NOTE — H&P
Hospital Medicine History & Physical      PCP: Julio Valenzuela MD    Date of Admission: 2024    Date of Service: Pt seen/examined on 24     Chief Complaint:    Chief Complaint   Patient presents with    Hemoptysis     Patient arrives from NH with reports of coughing up blood, patient states that she hurts all over.         History Of Present Illness:      The patient is a 83 y.o. female who presents to [facility] from nursing home with cough. Per nursing home staff, patient has had episodes of coughing with productive sputum, sometimes blood tinged. There was no bleeding while in the ED. Workup in ED remarkable for multifocal pneumonia. Patient is a poor historian due to history of dementia. She denies any chest pain, shortness of breath, light headedness or dizziness.  History obtained from the patient and review of EMR.     Past Medical History:        Diagnosis Date    Anemia     Atherosclerotic cardiovascular disease     CAD (coronary artery disease)     CKD (chronic kidney disease)     Convulsions (HCC)     Dementia (HCC)     Depression     Depression     Diabetes (HCC)     Diabetes mellitus (HCC)     Falls     Glaucoma     HTN (hypertension)     Hyperlipidemia     Insomnia     Parkinson disease (HCC)     Seizures (HCC)     Type II or unspecified type diabetes mellitus without mention of complication, not stated as uncontrolled     Weakness        Past Surgical History:        Procedure Laterality Date     SECTION      CHOLECYSTECTOMY      CORONARY ARTERY BYPASS GRAFT  [id number]    FRACTURE SURGERY Left     HIP SURGERY  1/25/15    right hip pinning    HYSTERECTOMY (CERVIX STATUS UNKNOWN)      ROTATOR CUFF REPAIR         Medications Prior to Admission:    Prior to Admission medications    Medication Sig Start Date End Date Taking? Authorizing Provider   insulin glargine (BASAGLAR KWIKPEN) 100 UNIT/ML injection pen Inject 6 Units into the skin nightly   Yes Provider, MD Maryann

## 2024-05-20 NOTE — CARE COORDINATION
Case Management Assessment  Initial Evaluation    Date/Time of Evaluation: 5/20/2024 3:41 PM  Assessment Completed by: MINERVA Valle    If patient is discharged prior to next notation, then this note serves as note for discharge by case management.    Patient Name: Maya Morales                   YOB: 1940  Diagnosis: HCAP (healthcare-associated pneumonia) [J18.9]  Multifocal pneumonia [J18.9]                   Date / Time: 5/19/2024 11:56 AM    Patient Admission Status: Inpatient   Readmission Risk (Low < 19, Mod (19-27), High > 27): Readmission Risk Score: 15.2    Current PCP: Julio Valenzuela MD  PCP verified by CM? (P) No    Chart Reviewed: Yes      History Provided by: (P) Medical Record, Other (see comment) ([facility])  Patient Orientation: (P) Unable to Assess    Patient Cognition: (P) Dementia / Early Alzheimer's    Hospitalization in the last 30 days (Readmission):  No    If yes, Readmission Assessment in  Navigator will be completed.    Advance Directives:      Code Status: DNR-CCA   Patient's Primary Decision Maker is: (P) Named in Scanned ACP Document      Discharge Planning:    Patient lives with: (P) Other (Comment) (ltc) Type of Home: (P) Long-Term Care  Primary Care Giver: (P) Other (Comment) ([email or web address]@V[facility])  Patient Support Systems include: (P) Other (Comment) ([email or web address]@V[facility])   Current Financial resources: (P) Medicare, Medicaid  Current community resources: (P) None  Current services prior to admission: (P) Extended Care Facility            Current DME:              Type of Home Care services:  (P) Nursing Services    ADLS  Prior functional level: (P) Assistance with the following:, Mobility, Shopping, Housework, Bathing, Dressing  Current functional level: (P) Housework, Shopping, Mobility, Bathing, Dressing    PT AM-PAC:   /24  OT AM-PAC:   /24    Family can provide assistance at DC: (P) No  Would you like Case Management to discuss the discharge plan with any other family

## 2024-05-21 ENCOUNTER — APPOINTMENT (OUTPATIENT)
Dept: GENERAL RADIOLOGY | Age: 84
DRG: 178 | End: 2024-05-21
Payer: MEDICARE

## 2024-05-21 PROBLEM — E87.20 METABOLIC ACIDOSIS: Status: ACTIVE | Noted: 2024-05-21

## 2024-05-21 LAB
ALBUMIN SERPL-MCNC: 2.2 G/DL (ref 3.4–5)
ALBUMIN/GLOB SERPL: 0.5 {RATIO} (ref 1.1–2.2)
ALP SERPL-CCNC: 104 U/L (ref 40–129)
ALT SERPL-CCNC: 38 U/L (ref 10–40)
ANION GAP SERPL CALCULATED.3IONS-SCNC: 16 MMOL/L (ref 3–16)
AST SERPL-CCNC: 71 U/L (ref 15–37)
BASOPHILS # BLD: 0.1 K/UL (ref 0–0.2)
BASOPHILS NFR BLD: 0.4 %
BILIRUB SERPL-MCNC: 0.4 MG/DL (ref 0–1)
BUN SERPL-MCNC: 33 MG/DL (ref 7–20)
CALCIUM SERPL-MCNC: 8 MG/DL (ref 8.3–10.6)
CHLORIDE SERPL-SCNC: 111 MMOL/L (ref 99–110)
CO2 SERPL-SCNC: 12 MMOL/L (ref 21–32)
CREAT SERPL-MCNC: 1.3 MG/DL (ref 0.6–1.2)
DEPRECATED RDW RBC AUTO: 13.8 % (ref 12.4–15.4)
EOSINOPHIL # BLD: 0.2 K/UL (ref 0–0.6)
EOSINOPHIL NFR BLD: 1.6 %
GFR SERPLBLD CREATININE-BSD FMLA CKD-EPI: 41 ML/MIN/{1.73_M2}
GLUCOSE BLD-MCNC: 105 MG/DL (ref 70–99)
GLUCOSE BLD-MCNC: 115 MG/DL (ref 70–99)
GLUCOSE BLD-MCNC: 171 MG/DL (ref 70–99)
GLUCOSE BLD-MCNC: 178 MG/DL (ref 70–99)
GLUCOSE SERPL-MCNC: 101 MG/DL (ref 70–99)
HCT VFR BLD AUTO: 29 % (ref 36–48)
HGB BLD-MCNC: 9.3 G/DL (ref 12–16)
LYMPHOCYTES # BLD: 1.5 K/UL (ref 1–5.1)
LYMPHOCYTES NFR BLD: 11.7 %
MCH RBC QN AUTO: 30.3 PG (ref 26–34)
MCHC RBC AUTO-ENTMCNC: 31.9 G/DL (ref 31–36)
MCV RBC AUTO: 95.2 FL (ref 80–100)
MONOCYTES # BLD: 1.1 K/UL (ref 0–1.3)
MONOCYTES NFR BLD: 8.5 %
NEUTROPHILS # BLD: 10.2 K/UL (ref 1.7–7.7)
NEUTROPHILS NFR BLD: 77.8 %
PERFORMED ON: ABNORMAL
PLATELET # BLD AUTO: 356 K/UL (ref 135–450)
PLATELET BLD QL SMEAR: ADEQUATE
PMV BLD AUTO: 7.4 FL (ref 5–10.5)
POTASSIUM SERPL-SCNC: 4.5 MMOL/L (ref 3.5–5.1)
PROT SERPL-MCNC: 6.4 G/DL (ref 6.4–8.2)
RBC # BLD AUTO: 3.05 M/UL (ref 4–5.2)
SLIDE REVIEW: ABNORMAL
SODIUM SERPL-SCNC: 139 MMOL/L (ref 136–145)
VANCOMYCIN SERPL-MCNC: 19.1 UG/ML
WBC # BLD AUTO: 13.1 K/UL (ref 4–11)

## 2024-05-21 PROCEDURE — 80053 COMPREHEN METABOLIC PANEL: CPT

## 2024-05-21 PROCEDURE — 6370000000 HC RX 637 (ALT 250 FOR IP): Performed by: INTERNAL MEDICINE

## 2024-05-21 PROCEDURE — 2580000003 HC RX 258: Performed by: INTERNAL MEDICINE

## 2024-05-21 PROCEDURE — 74230 X-RAY XM SWLNG FUNCJ C+: CPT

## 2024-05-21 PROCEDURE — 99233 SBSQ HOSP IP/OBS HIGH 50: CPT | Performed by: INTERNAL MEDICINE

## 2024-05-21 PROCEDURE — 1200000000 HC SEMI PRIVATE

## 2024-05-21 PROCEDURE — 92526 ORAL FUNCTION THERAPY: CPT

## 2024-05-21 PROCEDURE — 92611 MOTION FLUOROSCOPY/SWALLOW: CPT

## 2024-05-21 PROCEDURE — 6360000002 HC RX W HCPCS

## 2024-05-21 PROCEDURE — 36415 COLL VENOUS BLD VENIPUNCTURE: CPT

## 2024-05-21 PROCEDURE — 2500000003 HC RX 250 WO HCPCS: Performed by: INTERNAL MEDICINE

## 2024-05-21 PROCEDURE — 85025 COMPLETE CBC W/AUTO DIFF WBC: CPT

## 2024-05-21 PROCEDURE — 6360000002 HC RX W HCPCS: Performed by: INTERNAL MEDICINE

## 2024-05-21 PROCEDURE — 80202 ASSAY OF VANCOMYCIN: CPT

## 2024-05-21 RX ORDER — BENZONATATE 100 MG/1
100 CAPSULE ORAL 3 TIMES DAILY PRN
Status: DISCONTINUED | OUTPATIENT
Start: 2024-05-21 | End: 2024-05-23 | Stop reason: HOSPADM

## 2024-05-21 RX ADMIN — SODIUM BICARBONATE: 84 INJECTION, SOLUTION INTRAVENOUS at 13:12

## 2024-05-21 RX ADMIN — LEVOTHYROXINE SODIUM 50 MCG: 50 TABLET ORAL at 05:26

## 2024-05-21 RX ADMIN — DORZOLAMIDE HYDROCHLORIDE 1 DROP: 20 SOLUTION/ DROPS OPHTHALMIC at 08:25

## 2024-05-21 RX ADMIN — TIMOLOL MALEATE 1 DROP: 5 SOLUTION OPHTHALMIC at 08:25

## 2024-05-21 RX ADMIN — LEVETIRACETAM 250 MG: 100 INJECTION, SOLUTION INTRAVENOUS at 08:17

## 2024-05-21 RX ADMIN — CIPROFLOXACIN AND DEXAMETHASONE 3 DROP: 3; 1 SUSPENSION/ DROPS AURICULAR (OTIC) at 08:27

## 2024-05-21 RX ADMIN — Medication 10 ML: at 21:40

## 2024-05-21 RX ADMIN — AMLODIPINE BESYLATE 5 MG: 5 TABLET ORAL at 13:17

## 2024-05-21 RX ADMIN — BRIMONIDINE TARTRATE 1 DROP: 2 SOLUTION/ DROPS OPHTHALMIC at 21:50

## 2024-05-21 RX ADMIN — CIPROFLOXACIN AND DEXAMETHASONE 3 DROP: 3; 1 SUSPENSION/ DROPS AURICULAR (OTIC) at 21:49

## 2024-05-21 RX ADMIN — CEFEPIME 1000 MG: 1 INJECTION, POWDER, FOR SOLUTION INTRAMUSCULAR; INTRAVENOUS at 15:07

## 2024-05-21 RX ADMIN — Medication 1 CAPSULE: at 21:40

## 2024-05-21 RX ADMIN — ESCITALOPRAM OXALATE 20 MG: 10 TABLET ORAL at 13:17

## 2024-05-21 RX ADMIN — MIRTAZAPINE 15 MG: 15 TABLET, FILM COATED ORAL at 21:40

## 2024-05-21 RX ADMIN — ASPIRIN 81 MG: 81 TABLET, CHEWABLE ORAL at 13:17

## 2024-05-21 RX ADMIN — LEVETIRACETAM 250 MG: 250 TABLET, FILM COATED ORAL at 21:40

## 2024-05-21 RX ADMIN — ENOXAPARIN SODIUM 30 MG: 100 INJECTION SUBCUTANEOUS at 08:11

## 2024-05-21 RX ADMIN — Medication 6 MG: at 21:41

## 2024-05-21 RX ADMIN — BRIMONIDINE TARTRATE 1 DROP: 2 SOLUTION/ DROPS OPHTHALMIC at 08:25

## 2024-05-21 RX ADMIN — INSULIN GLARGINE 6 UNITS: 100 INJECTION, SOLUTION SUBCUTANEOUS at 21:40

## 2024-05-21 RX ADMIN — LATANOPROST 1 DROP: 50 SOLUTION OPHTHALMIC at 21:50

## 2024-05-21 RX ADMIN — DORZOLAMIDE HYDROCHLORIDE 1 DROP: 20 SOLUTION/ DROPS OPHTHALMIC at 21:50

## 2024-05-21 RX ADMIN — ATORVASTATIN CALCIUM 20 MG: 10 TABLET, FILM COATED ORAL at 21:41

## 2024-05-21 RX ADMIN — VANCOMYCIN HYDROCHLORIDE 500 MG: 500 INJECTION, POWDER, LYOPHILIZED, FOR SOLUTION INTRAVENOUS at 08:33

## 2024-05-21 RX ADMIN — CEFEPIME 1000 MG: 1 INJECTION, POWDER, FOR SOLUTION INTRAMUSCULAR; INTRAVENOUS at 01:36

## 2024-05-21 RX ADMIN — TIMOLOL MALEATE 1 DROP: 5 SOLUTION OPHTHALMIC at 21:50

## 2024-05-21 NOTE — PROCEDURES
SPEECH/LANGUAGE PATHOLOGY  Swallowing Disorders and Dysphagia  VIDEOFLUOROSCOPIC STUDY OF SWALLOWING (VFSS) / Modified Barium Swallow Study (MBSS)  [facility] [address]    Diet Recommendation: IDDSI 4 Puree Solids ; IDDSI 0 Thin Liquids; Meds crushed in puree as able  Risk Management: upright for all intake, stay upright for at least 30 mins after intake, small bites/sips, distant supervision with intake, oral care 2-3x/day to reduce adverse affects in the event of aspiration, slow rate of intake, general GERD precautions, general aspiration precautions, and hold PO and contact SLP if s/s of aspiration or worsening respiratory status develop.    PATIENT NAME:  Maya Morales      :  1940    Room: 0208/0208-01   TODAY'S DATE:  2024    [x]The admitting diagnosis and active problem list, as listed below have been reviewed prior to initiation of this evaluation.     ADMITTING DIAGNOSIS: HCAP (healthcare-associated pneumonia) [J18.9]  Multifocal pneumonia [J18.9]     ACTIVE PROBLEM LIST:   Patient Active Problem List   Diagnosis    S/P CABG x 3    CAD (coronary artery disease)    HTN (hypertension)    Hyperlipidemia with target LDL less than 100    Migraine    Cataract    Onychomycosis    Insomnia    Tremor    Peripheral neuropathy    Diabetes mellitus (HCC)    Hypoxemia requiring supplemental oxygen    Dyspnea    Aspiration pneumonitis (HCC)    Hypotension    Severe sepsis (HCC)    Hypoxemia    Disorder of electrolytes    Acute renal failure (ARF) (HCC)    Anemia    Thrombocytopenia (HCC)    Altered mental status    Hip fracture (HCC)    Radial head fracture    HCAP (healthcare-associated pneumonia)    Hemoptysis    Stage 3b chronic kidney disease (HCC)    Dementia (HCC)    Parkinson's disease (HCC)    Multifocal pneumonia           PAST MEDICAL HISTORY        Diagnosis Date    Anemia     Atherosclerotic cardiovascular disease     CAD (coronary artery disease)

## 2024-05-22 LAB
ALBUMIN SERPL-MCNC: 2.2 G/DL (ref 3.4–5)
ALBUMIN/GLOB SERPL: 0.5 {RATIO} (ref 1.1–2.2)
ALP SERPL-CCNC: 103 U/L (ref 40–129)
ALT SERPL-CCNC: 36 U/L (ref 10–40)
ANION GAP SERPL CALCULATED.3IONS-SCNC: 11 MMOL/L (ref 3–16)
AST SERPL-CCNC: 54 U/L (ref 15–37)
BASOPHILS # BLD: 0 K/UL (ref 0–0.2)
BASOPHILS NFR BLD: 0.3 %
BILIRUB SERPL-MCNC: 0.3 MG/DL (ref 0–1)
BUN SERPL-MCNC: 25 MG/DL (ref 7–20)
CALCIUM SERPL-MCNC: 8.1 MG/DL (ref 8.3–10.6)
CHLORIDE SERPL-SCNC: 113 MMOL/L (ref 99–110)
CO2 SERPL-SCNC: 18 MMOL/L (ref 21–32)
CREAT SERPL-MCNC: 1.2 MG/DL (ref 0.6–1.2)
DEPRECATED RDW RBC AUTO: 13.1 % (ref 12.4–15.4)
EOSINOPHIL # BLD: 0.2 K/UL (ref 0–0.6)
EOSINOPHIL NFR BLD: 2.3 %
GFR SERPLBLD CREATININE-BSD FMLA CKD-EPI: 45 ML/MIN/{1.73_M2}
GLUCOSE BLD-MCNC: 143 MG/DL (ref 70–99)
GLUCOSE BLD-MCNC: 151 MG/DL (ref 70–99)
GLUCOSE BLD-MCNC: 175 MG/DL (ref 70–99)
GLUCOSE BLD-MCNC: 212 MG/DL (ref 70–99)
GLUCOSE BLD-MCNC: 242 MG/DL (ref 70–99)
GLUCOSE SERPL-MCNC: 206 MG/DL (ref 70–99)
HCT VFR BLD AUTO: 28.2 % (ref 36–48)
HGB BLD-MCNC: 9.4 G/DL (ref 12–16)
LYMPHOCYTES # BLD: 1.1 K/UL (ref 1–5.1)
LYMPHOCYTES NFR BLD: 10.9 %
MCH RBC QN AUTO: 31.2 PG (ref 26–34)
MCHC RBC AUTO-ENTMCNC: 33.2 G/DL (ref 31–36)
MCV RBC AUTO: 93.9 FL (ref 80–100)
MONOCYTES # BLD: 1 K/UL (ref 0–1.3)
MONOCYTES NFR BLD: 9.1 %
NEUTROPHILS # BLD: 8.1 K/UL (ref 1.7–7.7)
NEUTROPHILS NFR BLD: 77.4 %
PERFORMED ON: ABNORMAL
PLATELET # BLD AUTO: 367 K/UL (ref 135–450)
PMV BLD AUTO: 6.7 FL (ref 5–10.5)
POTASSIUM SERPL-SCNC: 4.1 MMOL/L (ref 3.5–5.1)
PROT SERPL-MCNC: 6.4 G/DL (ref 6.4–8.2)
RBC # BLD AUTO: 3 M/UL (ref 4–5.2)
SODIUM SERPL-SCNC: 142 MMOL/L (ref 136–145)
VANCOMYCIN SERPL-MCNC: 15.8 UG/ML
WBC # BLD AUTO: 10.5 K/UL (ref 4–11)

## 2024-05-22 PROCEDURE — 2580000003 HC RX 258: Performed by: INTERNAL MEDICINE

## 2024-05-22 PROCEDURE — 6360000002 HC RX W HCPCS: Performed by: INTERNAL MEDICINE

## 2024-05-22 PROCEDURE — 36415 COLL VENOUS BLD VENIPUNCTURE: CPT

## 2024-05-22 PROCEDURE — 80053 COMPREHEN METABOLIC PANEL: CPT

## 2024-05-22 PROCEDURE — 1200000000 HC SEMI PRIVATE

## 2024-05-22 PROCEDURE — 99232 SBSQ HOSP IP/OBS MODERATE 35: CPT | Performed by: INTERNAL MEDICINE

## 2024-05-22 PROCEDURE — 80202 ASSAY OF VANCOMYCIN: CPT

## 2024-05-22 PROCEDURE — 85025 COMPLETE CBC W/AUTO DIFF WBC: CPT

## 2024-05-22 PROCEDURE — 6370000000 HC RX 637 (ALT 250 FOR IP): Performed by: INTERNAL MEDICINE

## 2024-05-22 PROCEDURE — 2500000003 HC RX 250 WO HCPCS: Performed by: INTERNAL MEDICINE

## 2024-05-22 PROCEDURE — 6370000000 HC RX 637 (ALT 250 FOR IP)

## 2024-05-22 RX ORDER — ENOXAPARIN SODIUM 100 MG/ML
40 INJECTION SUBCUTANEOUS DAILY
Status: DISCONTINUED | OUTPATIENT
Start: 2024-05-23 | End: 2024-05-23 | Stop reason: HOSPADM

## 2024-05-22 RX ADMIN — SODIUM BICARBONATE: 84 INJECTION, SOLUTION INTRAVENOUS at 23:08

## 2024-05-22 RX ADMIN — CEFEPIME 1000 MG: 1 INJECTION, POWDER, FOR SOLUTION INTRAMUSCULAR; INTRAVENOUS at 01:23

## 2024-05-22 RX ADMIN — INSULIN LISPRO 1 UNITS: 100 INJECTION, SOLUTION INTRAVENOUS; SUBCUTANEOUS at 12:09

## 2024-05-22 RX ADMIN — AMLODIPINE BESYLATE 5 MG: 5 TABLET ORAL at 12:09

## 2024-05-22 RX ADMIN — ESCITALOPRAM OXALATE 20 MG: 10 TABLET ORAL at 12:09

## 2024-05-22 RX ADMIN — ASPIRIN 81 MG: 81 TABLET, CHEWABLE ORAL at 12:09

## 2024-05-22 RX ADMIN — SODIUM BICARBONATE: 84 INJECTION, SOLUTION INTRAVENOUS at 02:03

## 2024-05-22 RX ADMIN — VANCOMYCIN HYDROCHLORIDE 750 MG: 750 INJECTION, POWDER, LYOPHILIZED, FOR SOLUTION INTRAVENOUS at 12:26

## 2024-05-22 RX ADMIN — LEVETIRACETAM 250 MG: 250 TABLET, FILM COATED ORAL at 12:09

## 2024-05-22 RX ADMIN — Medication 1 CAPSULE: at 12:09

## 2024-05-22 RX ADMIN — POTASSIUM CHLORIDE 10 MEQ: 750 TABLET, EXTENDED RELEASE ORAL at 12:10

## 2024-05-23 VITALS
BODY MASS INDEX: 25.83 KG/M2 | WEIGHT: 140.4 LBS | OXYGEN SATURATION: 93 % | DIASTOLIC BLOOD PRESSURE: 61 MMHG | HEART RATE: 92 BPM | RESPIRATION RATE: 16 BRPM | TEMPERATURE: 97.8 F | SYSTOLIC BLOOD PRESSURE: 164 MMHG | HEIGHT: 62 IN

## 2024-05-23 LAB
ANION GAP SERPL CALCULATED.3IONS-SCNC: 9 MMOL/L (ref 3–16)
BACTERIA BLD CULT ORG #2: NORMAL
BACTERIA BLD CULT: NORMAL
BUN SERPL-MCNC: 16 MG/DL (ref 7–20)
CALCIUM SERPL-MCNC: 7.9 MG/DL (ref 8.3–10.6)
CHLORIDE SERPL-SCNC: 112 MMOL/L (ref 99–110)
CO2 SERPL-SCNC: 22 MMOL/L (ref 21–32)
CREAT SERPL-MCNC: 1.1 MG/DL (ref 0.6–1.2)
DEPRECATED RDW RBC AUTO: 13.1 % (ref 12.4–15.4)
GFR SERPLBLD CREATININE-BSD FMLA CKD-EPI: 50 ML/MIN/{1.73_M2}
GLUCOSE BLD-MCNC: 148 MG/DL (ref 70–99)
GLUCOSE BLD-MCNC: 167 MG/DL (ref 70–99)
GLUCOSE SERPL-MCNC: 169 MG/DL (ref 70–99)
HCT VFR BLD AUTO: 27.9 % (ref 36–48)
HGB BLD-MCNC: 9.1 G/DL (ref 12–16)
MCH RBC QN AUTO: 30.5 PG (ref 26–34)
MCHC RBC AUTO-ENTMCNC: 32.7 G/DL (ref 31–36)
MCV RBC AUTO: 93.1 FL (ref 80–100)
PERFORMED ON: ABNORMAL
PERFORMED ON: ABNORMAL
PLATELET # BLD AUTO: 396 K/UL (ref 135–450)
PMV BLD AUTO: 7 FL (ref 5–10.5)
POTASSIUM SERPL-SCNC: 3.9 MMOL/L (ref 3.5–5.1)
RBC # BLD AUTO: 3 M/UL (ref 4–5.2)
SODIUM SERPL-SCNC: 143 MMOL/L (ref 136–145)
VANCOMYCIN SERPL-MCNC: 16.1 UG/ML
WBC # BLD AUTO: 8.9 K/UL (ref 4–11)

## 2024-05-23 PROCEDURE — 2580000003 HC RX 258: Performed by: INTERNAL MEDICINE

## 2024-05-23 PROCEDURE — 6360000002 HC RX W HCPCS: Performed by: INTERNAL MEDICINE

## 2024-05-23 PROCEDURE — 80202 ASSAY OF VANCOMYCIN: CPT

## 2024-05-23 PROCEDURE — 85027 COMPLETE CBC AUTOMATED: CPT

## 2024-05-23 PROCEDURE — 6360000002 HC RX W HCPCS: Performed by: HOSPITALIST

## 2024-05-23 PROCEDURE — 36415 COLL VENOUS BLD VENIPUNCTURE: CPT

## 2024-05-23 PROCEDURE — 80048 BASIC METABOLIC PNL TOTAL CA: CPT

## 2024-05-23 RX ORDER — MORPHINE SULFATE 2 MG/ML
1 INJECTION, SOLUTION INTRAMUSCULAR; INTRAVENOUS ONCE
Status: COMPLETED | OUTPATIENT
Start: 2024-05-23 | End: 2024-05-23

## 2024-05-23 RX ORDER — LEVOFLOXACIN 500 MG/1
500 TABLET, FILM COATED ORAL DAILY
Qty: 5 TABLET | Refills: 0 | DISCHARGE
Start: 2024-05-23 | End: 2024-05-28

## 2024-05-23 RX ORDER — LEVETIRACETAM 500 MG/1
250 TABLET ORAL 2 TIMES DAILY
DISCHARGE
Start: 2024-05-23

## 2024-05-23 RX ORDER — GUAIFENESIN/DEXTROMETHORPHAN 100-10MG/5
5 SYRUP ORAL EVERY 4 HOURS PRN
Qty: 120 ML | DISCHARGE
Start: 2024-05-23 | End: 2024-06-02

## 2024-05-23 RX ORDER — BENZONATATE 100 MG/1
100 CAPSULE ORAL 3 TIMES DAILY PRN
DISCHARGE
Start: 2024-05-23 | End: 2024-05-30

## 2024-05-23 RX ADMIN — CEFEPIME 2000 MG: 2 INJECTION, POWDER, FOR SOLUTION INTRAVENOUS at 01:47

## 2024-05-23 RX ADMIN — CEFEPIME 2000 MG: 2 INJECTION, POWDER, FOR SOLUTION INTRAVENOUS at 15:10

## 2024-05-23 RX ADMIN — MORPHINE SULFATE 1 MG: 2 INJECTION, SOLUTION INTRAMUSCULAR; INTRAVENOUS at 02:03

## 2024-05-23 RX ADMIN — VANCOMYCIN HYDROCHLORIDE 750 MG: 750 INJECTION, POWDER, LYOPHILIZED, FOR SOLUTION INTRAVENOUS at 13:46

## 2024-05-23 RX ADMIN — Medication 10 ML: at 13:43

## 2024-05-23 ASSESSMENT — PAIN DESCRIPTION - DESCRIPTORS: DESCRIPTORS: DISCOMFORT

## 2024-05-23 ASSESSMENT — PAIN - FUNCTIONAL ASSESSMENT: PAIN_FUNCTIONAL_ASSESSMENT: PREVENTS OR INTERFERES SOME ACTIVE ACTIVITIES AND ADLS

## 2024-05-23 ASSESSMENT — PAIN DESCRIPTION - LOCATION: LOCATION: GENERALIZED

## 2024-05-23 NOTE — PLAN OF CARE
Problem: Discharge Planning  Goal: Discharge to home or other facility with appropriate resources  5/21/2024 0116 by Elyssa Moran RN  Outcome: Progressing  Flowsheets (Taken 5/20/2024 2130)  Discharge to home or other facility with appropriate resources: Identify barriers to discharge with patient and caregiver  5/20/2024 1707 by Mani Grijalva RN  Outcome: Progressing  5/20/2024 1152 by Mani Grijalva RN  Outcome: Progressing  5/20/2024 1152 by Mani Grijalva RN  Outcome: Progressing     Problem: Safety - Adult  Goal: Free from fall injury  5/21/2024 0116 by Elyssa Moran RN  Outcome: Progressing  5/20/2024 1707 by Mani Grijalva RN  Outcome: Progressing  5/20/2024 1152 by Mani Grijalva RN  Outcome: Progressing  5/20/2024 1152 by Mani Grijalva RN  Outcome: Progressing     Problem: Skin/Tissue Integrity  Goal: Absence of new skin breakdown  Description: 1.  Monitor for areas of redness and/or skin breakdown  2.  Assess vascular access sites hourly  3.  Every 4-6 hours minimum:  Change oxygen saturation probe site  4.  Every 4-6 hours:  If on nasal continuous positive airway pressure, respiratory therapy assess nares and determine need for appliance change or resting period.  5/21/2024 0116 by Elyssa Moran RN  Outcome: Progressing  5/20/2024 1707 by Mani Grijalva RN  Outcome: Progressing  5/20/2024 1152 by Mani Grijalva RN  Outcome: Progressing  5/20/2024 1152 by Mani Grijalva RN  Outcome: Progressing     Problem: ABCDS Injury Assessment  Goal: Absence of physical injury  5/21/2024 0116 by Elyssa Moran RN  Outcome: Progressing  5/20/2024 1707 by Mani Grijalva RN  Outcome: Progressing  5/20/2024 1152 by Mani Grijalva RN  Outcome: Progressing     Problem: Confusion  Goal: Confusion, delirium, dementia, or psychosis is improved or at baseline  Description: INTERVENTIONS:  1. Assess for possible contributors to thought disturbance, including medications, impaired vision or hearing, 
  Problem: Discharge Planning  Goal: Discharge to home or other facility with appropriate resources  5/21/2024 2219 by Elyssa Moran RN  Outcome: Progressing  Flowsheets (Taken 5/21/2024 2140)  Discharge to home or other facility with appropriate resources: Identify barriers to discharge with patient and caregiver  5/21/2024 1642 by Mani Grijalva RN  Outcome: Progressing     Problem: Safety - Adult  Goal: Free from fall injury  5/21/2024 2219 by Elyssa Moran RN  Outcome: Progressing  5/21/2024 1642 by Mani Grijalva RN  Outcome: Progressing     Problem: Skin/Tissue Integrity  Goal: Absence of new skin breakdown  Description: 1.  Monitor for areas of redness and/or skin breakdown  2.  Assess vascular access sites hourly  3.  Every 4-6 hours minimum:  Change oxygen saturation probe site  4.  Every 4-6 hours:  If on nasal continuous positive airway pressure, respiratory therapy assess nares and determine need for appliance change or resting period.  5/21/2024 2219 by Elyssa Moran RN  Outcome: Progressing  5/21/2024 1642 by Mani Grijalva RN  Outcome: Progressing     Problem: ABCDS Injury Assessment  Goal: Absence of physical injury  5/21/2024 2219 by Elyssa Moran RN  Outcome: Progressing  5/21/2024 1642 by Mani Grijalva RN  Outcome: Progressing     Problem: Confusion  Goal: Confusion, delirium, dementia, or psychosis is improved or at baseline  Description: INTERVENTIONS:  1. Assess for possible contributors to thought disturbance, including medications, impaired vision or hearing, underlying metabolic abnormalities, dehydration, psychiatric diagnoses, and notify attending LIP  2. [place] high risk fall precautions, as indicated  3. Provide frequent short contacts to provide reality reorientation, refocusing and direction  4. Decrease environmental stimuli, including noise as appropriate  5. Monitor and intervene to maintain adequate nutrition, hydration, elimination, sleep and activity  6. If unable to 
  Problem: Discharge Planning  Goal: Discharge to home or other facility with appropriate resources  5/23/2024 1541 by Ana Cristina Somers RN  Outcome: Adequate for Discharge  5/23/2024 0853 by Ninfa Almazan RN  Outcome: Progressing     Problem: Safety - Adult  Goal: Free from fall injury  5/23/2024 1541 by Ana Cristina Somers RN  Outcome: Adequate for Discharge  5/23/2024 0853 by Ninfa Almazan RN  Outcome: Progressing     Problem: Skin/Tissue Integrity  Goal: Absence of new skin breakdown  Description: 1.  Monitor for areas of redness and/or skin breakdown  2.  Assess vascular access sites hourly  3.  Every 4-6 hours minimum:  Change oxygen saturation probe site  4.  Every 4-6 hours:  If on nasal continuous positive airway pressure, respiratory therapy assess nares and determine need for appliance change or resting period.  5/23/2024 1541 by Ana Cristina Somers RN  Outcome: Adequate for Discharge  5/23/2024 0853 by Ninfa Almazan RN  Outcome: Progressing     Problem: ABCDS Injury Assessment  Goal: Absence of physical injury  5/23/2024 1541 by Ana Cristina Somers RN  Outcome: Adequate for Discharge  5/23/2024 0853 by Ninfa Almazan RN  Outcome: Progressing     Problem: Confusion  Goal: Confusion, delirium, dementia, or psychosis is improved or at baseline  Description: INTERVENTIONS:  1. Assess for possible contributors to thought disturbance, including medications, impaired vision or hearing, underlying metabolic abnormalities, dehydration, psychiatric diagnoses, and notify attending LIP  2. [place] high risk fall precautions, as indicated  3. Provide frequent short contacts to provide reality reorientation, refocusing and direction  4. Decrease environmental stimuli, including noise as appropriate  5. Monitor and intervene to maintain adequate nutrition, hydration, elimination, sleep and activity  6. If unable to ensure safety without constant attention obtain sitter and review sitter guidelines with 
  Problem: Discharge Planning  Goal: Discharge to home or other facility with appropriate resources  Outcome: Progressing     
  Problem: Discharge Planning  Goal: Discharge to home or other facility with appropriate resources  Outcome: Progressing     Problem: Safety - Adult  Goal: Free from fall injury  Outcome: Progressing     Problem: Skin/Tissue Integrity  Goal: Absence of new skin breakdown  Description: 1.  Monitor for areas of redness and/or skin breakdown  2.  Assess vascular access sites hourly  3.  Every 4-6 hours minimum:  Change oxygen saturation probe site  4.  Every 4-6 hours:  If on nasal continuous positive airway pressure, respiratory therapy assess nares and determine need for appliance change or resting period.  5/20/2024 0027 by Elyssa Moran RN  Outcome: Progressing  5/19/2024 1823 by Jennifer Subramanian RN  Outcome: Progressing     Problem: ABCDS Injury Assessment  Goal: Absence of physical injury  5/20/2024 0027 by Elyssa Moran RN  Outcome: Progressing  5/19/2024 1823 by Jennifer Subramanian RN  Outcome: Progressing     Problem: Confusion  Goal: Confusion, delirium, dementia, or psychosis is improved or at baseline  Description: INTERVENTIONS:  1. Assess for possible contributors to thought disturbance, including medications, impaired vision or hearing, underlying metabolic abnormalities, dehydration, psychiatric diagnoses, and notify attending LIP  2. [place] high risk fall precautions, as indicated  3. Provide frequent short contacts to provide reality reorientation, refocusing and direction  4. Decrease environmental stimuli, including noise as appropriate  5. Monitor and intervene to maintain adequate nutrition, hydration, elimination, sleep and activity  6. If unable to ensure safety without constant attention obtain sitter and review sitter guidelines with assigned personnel  7. Initiate Psychosocial CNS and Spiritual Care consult, as indicated  Outcome: Progressing     
  Problem: Discharge Planning  Goal: Discharge to home or other facility with appropriate resources  Outcome: Progressing     Problem: Safety - Adult  Goal: Free from fall injury  Outcome: Progressing     Problem: Skin/Tissue Integrity  Goal: Absence of new skin breakdown  Description: 1.  Monitor for areas of redness and/or skin breakdown  2.  Assess vascular access sites hourly  3.  Every 4-6 hours minimum:  Change oxygen saturation probe site  4.  Every 4-6 hours:  If on nasal continuous positive airway pressure, respiratory therapy assess nares and determine need for appliance change or resting period.  Outcome: Progressing     Problem: ABCDS Injury Assessment  Goal: Absence of physical injury  Outcome: Progressing     
  Problem: Discharge Planning  Goal: Discharge to home or other facility with appropriate resources  Outcome: Progressing     Problem: Skin/Tissue Integrity  Goal: Absence of new skin breakdown  Description: 1.  Monitor for areas of redness and/or skin breakdown  2.  Assess vascular access sites hourly  3.  Every 4-6 hours minimum:  Change oxygen saturation probe site  4.  Every 4-6 hours:  If on nasal continuous positive airway pressure, respiratory therapy assess nares and determine need for appliance change or resting period.  Outcome: Progressing     Problem: ABCDS Injury Assessment  Goal: Absence of physical injury  Outcome: Progressing     Problem: Confusion  Goal: Confusion, delirium, dementia, or psychosis is improved or at baseline  Description: INTERVENTIONS:  1. Assess for possible contributors to thought disturbance, including medications, impaired vision or hearing, underlying metabolic abnormalities, dehydration, psychiatric diagnoses, and notify attending LIP  2. [place] high risk fall precautions, as indicated  3. Provide frequent short contacts to provide reality reorientation, refocusing and direction  4. Decrease environmental stimuli, including noise as appropriate  5. Monitor and intervene to maintain adequate nutrition, hydration, elimination, sleep and activity  6. If unable to ensure safety without constant attention obtain sitter and review sitter guidelines with assigned personnel  7. Initiate Psychosocial CNS and Spiritual Care consult, as indicated  Outcome: Progressing  Flowsheets (Taken 5/22/2024 2300)  Effect of thought disturbance (confusion, delirium, dementia, or psychosis) are managed with adequate functional status: Assess for contributors to thought disturbance, including medications, impaired vision or hearing, underlying metabolic abnormalities, dehydration, psychiatric diagnoses, notify LIP     Problem: Metabolic/Fluid and Electrolytes - Adult  Goal: Electrolytes maintained 
  Problem: SLP Adult - Impaired Swallowing  Goal: By Discharge: Advance to least restrictive diet without signs or symptoms of aspiration for planned discharge setting.  See evaluation for individualized goals.  Note: SLP completed evaluation. Please refer to notes in EMR.    Jillian Bernal M.A., CCC-SLP   Speech-Language Pathologist #[id number]  Speech Pathology and Swallowing Disorders  P: (inpatient): [id number] (speech desk): [phone]  E: [email or web address]  Certified to provide:  FEES, MBS, Vital Stim, and [place] Dysphagia Therapy Program (MDTP)     
  Problem: Skin/Tissue Integrity  Goal: Absence of new skin breakdown  Description: 1.  Monitor for areas of redness and/or skin breakdown  2.  Assess vascular access sites hourly  3.  Every 4-6 hours minimum:  Change oxygen saturation probe site  4.  Every 4-6 hours:  If on nasal continuous positive airway pressure, respiratory therapy assess nares and determine need for appliance change or resting period.  Outcome: Progressing     Problem: ABCDS Injury Assessment  Goal: Absence of physical injury  Outcome: Progressing     
83-year-old patient from nursing home.    Sent in for productive cough, coughing up yellow phlegm, altered mentation.  Workup in the ER included chest x-ray and CT chest that showed multifocal pneumonia.  Admit for HCAP.  IV Vanco and cefepime.  BNP elevated, hence gentle IV fluids only.  N.p.o.-swallow eval  
consult, as indicated  5/20/2024 1152 by Mani Grijalva, RN  Outcome: Progressing  5/20/2024 0027 by Elyssa Moran, RN  Outcome: Progressing

## 2024-05-23 NOTE — PROGRESS NOTES
Speech Language Pathology  Attempt Note     Name: Maya Morales  : 1940  Medical Diagnosis: HCAP (healthcare-associated pneumonia) [J18.9]  Multifocal pneumonia [J18.9]      SLP attempted to see pt for bedside swallow evaluation (BSE). Order acknowledged and chart reviewed for completion of eval. Evaluation / treatment unable to be completed due to pt refusing to participate despite maximal cueing x5 and RN attempt. SLP to re-attempt as pt's condition and schedule allows. RN aware. No charges.    Thank you,  Caroline Anderson M.A., CF-SLP #[id number]  Speech-Language Pathologist  Desk: [phone]    
  Speech-Language Pathology  Swallowing Disorders and Dysphagia     SLP Attempt Note           Name: Maya Morales  : 1940  Medical Diagnosis: HCAP (healthcare-associated pneumonia) [J18.9]  Multifocal pneumonia [J18.9]    Attempted to see pt for dysphagia follow-up. Pt sleeping at time of attempt. Tried to wake her but pt adamantly refused to participate despite cues and encouragement. No charges filed. Will re-attempt at later time pending SLP schedule and pt availability. Thank you,     Jillian Bernal M.A., CCC-SLP   Speech-Language Pathologist #[id number]  Speech Pathology and Swallowing Disorders  P: (inpatient): [id number] (speech desk): [phone]  E: [email or web address]  Certified to provide:  FEES, MBS, Vital Stim, and [facility] Dysphagia Therapy Program (MDTP)         
4 Eyes Skin Assessment     NAME:  Maya Morales  YOB: 1940  MEDICAL RECORD NUMBER:  [id number]    The patient is being assessed for  Admission    I agree that at least one RN has performed a thorough Head to Toe Skin Assessment on the patient. ALL assessment sites listed below have been assessed.      Areas assessed by both nurses:    Head, Face, Ears, Shoulders, Back, Chest, Arms, Elbows, Hands, Sacrum. Buttock, Coccyx, Ischium, and Legs. Feet and Heels        Redness to ABD folds and citlaly area    Does the Patient have a Wound? No noted wound(s)       Jono Prevention initiated by RN: No  Wound Care Orders initiated by RN: No    Pressure Injury (Stage 3,4, Unstageable, DTI, NWPT, and Complex wounds) if present, place Wound referral order by RN under : No    New Ostomies, if present place, Ostomy referral order under : No     Nurse 1 eSignature: Electronically signed by Jennifer Subramanian RN on 5/19/24 at 6:15 PM EDT    **SHARE this note so that the co-signing nurse can place an eSignature**    Nurse 2 eSignature: Electronically signed by Jazmyn Winkler RN on 5/19/24 at 6:22 PM EDT    
5/21  Vanc random = 19.1 mcg/mL at 0525.  Renal function slowly improving.  WBC trending down as well.  Give vancomycin 500 mg x1.  Check vanc random tomorrow in the AM (5/22 at 0600).  Jarvis Petty, PharmD  5/21/2024 6:44 AM  
5/22  Vanc random = 15.8 mcg/mL at 0556.  Renal function continues to improve.  Give vancomycin 750 mg x1.  Continue to check vanc levels daily (5/23 at 0600).  Jarvis Petty, PharmD  5/22/2024 6:57 AM  
Bedside report given to EMS transport.  
Bedside report given to Elyssa GARCIA. Pt denies needs at this time. No s/s of distress. Respirations easy and unlabored. Pt stable. Bed in low position call light within reach. No further needs at this time.    
Bedside report given to RADHA Mosher. Pt denies further needs at this time   
Care taken over from Vidhi GARCIA. Pt sleeping. Resp e/e. Will monitor. Elyssa Moran RN     
Care transferred to Elyssa GARCIA   
Comprehensive Nutrition Assessment    Type and Reason for Visit:  Initial, Positive Nutrition Screen (+ screen for MST = 2)    Nutrition Recommendations/Plan:   Continue NPO status until patient is medically cleared/cleared by SLP for po diet advancement with appropriate po consistencies.   Monitor SLP progress notes and whether diet is advanced.   Monitor nutrition-related labs, bowel function, and weight trends.      Malnutrition Assessment:  Malnutrition Status:  At risk for malnutrition (05/21/24 1152)    Context:  Acute Illness     Findings of the 6 clinical characteristics of malnutrition:  Energy Intake:  Mild decrease in energy intake  Weight Loss:  Unable to assess     Body Fat Loss:  Unable to assess (patient was resting)     Muscle Mass Loss:  Unable to assess (patient was resting)    Fluid Accumulation:  No significant fluid accumulation     Strength:  Not Performed    Nutrition Assessment:    patient is nutritionally compromised AEB decreased appetite/po intake PTA and patient has dementia + she arrived to ED with c/o hemoptysis and she is at risk for further compromise d/t NPO status, altered nutrition-related labs, and hx of dementia and she is unable to answer questions for staff; will continue NPO status and monitor nutrition plan of care    Nutrition Related Findings:    patient has hx of dementia and cannot provide answers to staff during this admission; she is A & O to person only; patient is from the [facility] and she presented with hemoptysis; social work attempted to contact patient's contacts but all 3 numbers were disconnected; patient remains in NPO status after SLP evaluation on 5/20/24; + BM on 5/21/24; NS infusing at 50 ml/hr Wound Type: None       Current Nutrition Intake & Therapies:    Average Meal Intake: NPO  Average Supplements Intake: NPO  Diet NPO Exceptions are: Sips of Water with Meds    Anthropometric Measures:  Height: 157.5 cm (5' 2.01\")  Ideal Body Weight 
Food and drink offered at this time, pt squeezed mouth shut tighter and vigorously shook head in refusal.    Attempted to get vitals, pt squeezed arms against chest  and tightened fists in refusal.  
Handoff report and transfer of care given at bedside to ess RN._Patient in stable condition, denies needs/concerns at this time.  Call light within reach.     
IM Progress Note    Admit Date:  5/19/2024    Pt with advanced Dementia- from SNF   Admitted with HCAP vs aspiration   Speech eval done-placed on pureed diet  Metabolic acidosis - on bicarb gtt     Subjective:  Ms. Morales seen.  Patient is slowly improving   she has advanced dementia, noncommunicative, not oriented.  She had a persistent cough yesterday -seems to have improved today   -does not appear short of breath   O2 sats are stable, afebrile      Objective:   Patient Vitals for the past 4 hrs:   BP Temp Temp src Pulse Resp SpO2   05/22/24 1200 (!) 170/57 98.9 °F (37.2 °C) Axillary 88 18 94 %            Intake/Output Summary (Last 24 hours) at 5/22/2024 1228  Last data filed at 5/22/2024 0508  Gross per 24 hour   Intake 3103.3 ml   Output --   Net 3103.3 ml         Physical Exam:  Gen: No distress. Alert.  Awake.  Not oriented.   Elderly female with advanced dementia.   Frail. Pleasant not in any distress  Eyes: PERRL. No sclera icterus. No conjunctival injection.   ENT: No discharge. Pharynx clear.   Neck: No JVD.  Trachea midline.  Resp: No accessory muscle use.  Diminished breath sounds . no crackles. No wheezes. No rhonchi.   CV: Regular rate. Regular rhythm. No murmur.  No rub. No edema.   Capillary Refill: Brisk,< 3 seconds   Peripheral Pulses: +2 palpable, equal bilaterally   GI: Non-tender. Non-distended.  Normal bowel sounds.  Skin: Warm and dry. No nodule on exposed extremities. No rash on exposed extremities.   M/S: No cyanosis. No joint deformity. No clubbing.   Neuro: Awake. Grossly nonfocal    Psych: Not oriented.  no anxiety or agitation.         Medications:  vancomycin, 750 mg, Once  [START ON 5/23/2024] enoxaparin, 40 mg, Daily  cefepime, 1,000 mg, Q12H  vancomycin (VANCOCIN) intermittent dosing (placeholder), , RX Placeholder  amLODIPine, 5 mg, Daily  aspirin, 81 mg, Daily  atorvastatin, 20 mg, QHS  ciprofloxacin-dexAMETHasone, 3 drop, TID  dorzolamide, 1 drop, BID  escitalopram, 20 mg, 
IM Progress Note    Admit Date:  5/19/2024    Pt with advanced Dementia- from SNF   Admitted with HCAP vs aspiration   Speech eval done-placed on pureed diet  Metabolic acidosis - on bicarb gtt     Subjective:  Ms. Morales seen.  Patient is slowly improving   she has advanced dementia, noncommunicative, not oriented.  She had a persistent cough yesterday -seems to have improved today   -does not appear short of breath   O2 sats are stable, afebrile      Objective:   Patient Vitals for the past 4 hrs:   BP Temp Temp src Pulse Resp SpO2   05/23/24 1047 (!) 164/61 97.8 °F (36.6 °C) Axillary 92 16 93 %            Intake/Output Summary (Last 24 hours) at 5/23/2024 1306  Last data filed at 5/23/2024 0147  Gross per 24 hour   Intake --   Output 180 ml   Net -180 ml         Physical Exam:  Gen: No distress. Alert.  Awake.  Not oriented.   Elderly female with advanced dementia.   Frail. Pleasant not in any distress  Eyes: PERRL. No sclera icterus. No conjunctival injection.   ENT: No discharge. Pharynx clear.   Neck: No JVD.  Trachea midline.  Resp: No accessory muscle use.  Diminished breath sounds . no crackles. No wheezes. No rhonchi.   CV: Regular rate. Regular rhythm. No murmur.  No rub. No edema.   Capillary Refill: Brisk,< 3 seconds   Peripheral Pulses: +2 palpable, equal bilaterally   GI: Non-tender. Non-distended.  Normal bowel sounds.  Skin: Warm and dry. No nodule on exposed extremities. No rash on exposed extremities.   M/S: No cyanosis. No joint deformity. No clubbing.   Neuro: Awake. Grossly nonfocal    Psych: Not oriented.  no anxiety or agitation.         Medications:  vancomycin, 750 mg, Once  enoxaparin, 40 mg, Daily  cefepime, 2,000 mg, Q12H  vancomycin (VANCOCIN) intermittent dosing (placeholder), , RX Placeholder  amLODIPine, 5 mg, Daily  aspirin, 81 mg, Daily  atorvastatin, 20 mg, QHS  dorzolamide, 1 drop, BID  escitalopram, 20 mg, Daily  insulin glargine, 6 Units, Nightly  latanoprost, 1 drop, 
Inpatient Occupational Therapy Evaluation and Treatment    Unit: [address] [facility]  Date:  5/20/2024  Patient Name:    Maya Morales  Admitting diagnosis:  HCAP (healthcare-associated pneumonia) [J18.9]  Multifocal pneumonia [J18.9]  Admit Date:  5/19/2024  Precautions/Restrictions/WB Status/ Lines/ Wounds/ Oxygen: Fall risk, Bed/chair alarm, Lines (IV and external catheter), Confusion, and Hopi (hard of hearing)    Treatment Time:  0587-4536  Treatment Number:  1  Timed Code Treatment Minutes: 28 minutes  Total Treatment Minutes:  38  minutes    Patient Goals for Therapy: \"none stated\"          Discharge Recommendations: LTC with OT   DME needs for discharge: Defer to facility       Therapy recommendations for staff:   Assist of 1 for sitting EOB    History of Present Illness: Per JASEN Elmore H&P:  \"83 y.o. female who presents to [facility] from nursing home with cough. Per nursing home staff, patient has had episodes of coughing with productive sputum, sometimes blood tinged. There was no bleeding while in the ED. Workup in ED remarkable for multifocal pneumonia.\"     AM-PAC Score: AM-PAC Inpatient Daily Activity Raw Score: 13     Subjective:  Patient lying reclined in bed with no family present.   Pt agreeable to this OT session.     Cognition:    A&O Person   Able to follow 1 step commands    Pain:   No  Location: N/A  Rating: NA /10  Pain Medicine Status: No request made    Preadmission Environment: Pt is a poor historian.  States she lives alone in a home in [place].  Per medical chart, pt is a resident of a nursing home.  Pt reports she receives assistance with dressing but unable to state who helps her.  States she ambulates without AD.      Objective:  Does this pt have an acute or acute on chronic diagnosis of CHF? No    Upper Extremity ROM:    Impaired R shoulder to approx 80 degrees active flexion    Dominant Hand: Right    Upper Extremity Strength:    BUE strength impaired but not formally assessed w/ 
Loss of IV access at due time of antibiotic 2 nurses tried to place an IV with no success. Clinical RN called to place IV with ultrasound. Pharmacy stated to give vanc once the new IV is place and that it doesn't need to be retimed since it is a one time order.   
Occupational Therapy  Patient refusing therapy services this date per PT. Will hold for today, will continue to follow as appropriate.    Beatris Godinez, OTR/L [id number]    
Patient constantly refusing for vitals checking, not letting us to touch for assessment, so rigid,stiff trying to pull hands and attempted to bite the the staff while trying to do anything. Not able to give night med's at this time. MD aware of patient current behavior. Notified via secure message, Waiting to place new orders for needed med's at this time.    
Patient has been very agitate this hit, she  refused her eye and ear drops and was very difficult to administer her oral medications, she has been refusing food covering her mouth and has been refusing care and combative with care.   
Perfect serve sent to Dr. Cardenas for panic Co2 level of 12 this morning. Elyssa Moran RN   
Physical Therapy    Noted outstanding PT order placed 5/20/24.  Writer attempted for the 3rd consecutive day to see her for evaluation. Patient in bed with eyes closed, non-verbal except for moaning, and swatting at writer's hands when attempting to engage her.  Will sign off. Please re-order if/when appropriate to participate.    Maria Luisa Aguilera, PT, DPT      
Physical Therapy    Order received and chart reviewed. Attempted to see pt for PT eval. Pt declining all therapy at this time. Minimally verbal, shaking head no and not answering PT's questions or commands. Will follow-up as PT schedule and pt status permit.    No Charge.    Jillian Arango, PT, DPT, OMT-C # [id number]    
Physical Therapy/Occupational Therapy    Chart reviewed and attempted to see patient this morning for PT evaluation/OT follow up. Pt lying in bed resting and not responding to therapist attempts at communication. Attempted to remove covers and pt repeatedly stating \"no no no\" and swatting at therapist hands. Will hold therapy at this time and attempt at later time/date as schedule and pt condition allow.     Chas Shi, PT, DPT   Carlos Shanks, OTR/L  
Pt refusing to allow speech therapist to do an evaluation at this time to ensure it is safe for pt to swallow. NP made aware.   
Pt resting. Resp e/e. Shift assessment completed and charted. No needs. Will monitor. Elyssa Moran RN     
Pt resting. Resp e/e. Shift assessment completed and charted. Pt given prn robitussin for frequent cough. Pt was able to take PO meds crushed with sips of water without having any difficulty swallowing or coughing after swallowing meds. No needs. Will monitor. Elyssa Moran RN     
Report called to Surinder at [facility].  
Report given @ bedside to Elyssa GARCIA, for transferral of care. Pt resting in bed. Call light in reach.     
Report given at bedside to Alexa GARCIA. Elyssa Moran RN     
Report given at bedside to Alice GARCIA. Elyssa Moran RN     
Report given at bedside to Alice GARCIA. Elyssa Moran RN     
Shift assessment complete. See flow sheet. Scheduled meds given. See MAR.  Patients head-toe complete, VS are logged, and active bowel sound noted in all four quadrants.     Patient on room air. No s/s of distress. Alert to self. Denies pain.     Patient sitting in bed, denies further needs at this time. Call light and bedside table are within reach. The bed is locked and is in the lowest position.         
Transfer of care to Magdalena GARCIA pt stable denies needs.   
Verified with pharmacy, alec to run Cefepime over 30 minutes. Pt expected to discharge within 1 hour.  
Healthcare Providers:  https://www.fda.gov/media/395438/download  Fact sheet for Patients: https://www.fda.gov/media/328185/download    METHODOLOGY: Isothermal Nucleic Acid Amplification                    RADIOLOGY  FL MODIFIED BARIUM SWALLOW W VIDEO   Final Result   1. Mild flash laryngeal penetration without tracheal aspiration of thin   liquids.   2. No evidence of laryngeal penetration or tracheal aspiration of purees.   Please see separate speech pathology report for full discussion of findings   and recommendations.         CT CHEST WO CONTRAST   Final Result   1. Multifocal pneumonia.  Follow-up to resolution is recommended.   2. Small right pleural effusion.         CT HEAD WO CONTRAST   Final Result   1. No acute intracranial abnormality.   2. Large right mastoid effusion.   3. Chronic sinus disease.         XR CHEST PORTABLE   Final Result   Increased bilateral pulmonary markings prominent noted in the right lung,   peripherally and right lung base. Findings are nonspecific and could be due   underlying interstitial lung disease with some without right basal pneumonia.               Assessment/Plan:    #Pneumonia   #hx aspiration pneumonitis  - Health care acquired: suspect a gram negative organism, also risk for MRSA  -Nursing home patient with dementia  - CT chest as above with multifocal pneumonia  - stable on room air  - check respiratory culture, blood cultures sent in ED  - MRSA swab negative   -Continue IV antibiotics vanc/cefepime D#2  - prn symptom control   - fever 100.4   - gentle IVFs   - SLP consulted to r/o aspiration -> MBS completed --> placed on pureed diet now      #Scant hemoptysis  - none presently  - likely 2/2 above, irritation with coughing  - CBC stable  - monitor      #Large right mastoid effusion  - incidental on CT head  - ENT consulted-> likely long standing, f/u as OP     #Elevated troponin  - 55 -> 59 -> repeat pending   - EKG without acute ischemic changes  - no CP  - likely 
  History Of Present Illness:       The patient is a 83 y.o. female who presents to [facility] from nursing home with cough. Per nursing home staff, patient has had episodes of coughing with productive sputum, sometimes blood tinged. There was no bleeding while in the ED. Workup in ED remarkable for multifocal pneumonia. Patient is a poor historian due to history of dementia. She denies any chest pain, shortness of breath, light headedness or dizziness.  History obtained from the patient and review of EMR        Patient Complaints:   N/A pt is not able to respond appropriately to questions regarding swallowing due to cognitive status    Baseline Method of Oral Meds: Whole with liquid     Admitting diagnoses and active problems as follows: Principal Problem:    HCAP (healthcare-associated pneumonia)  Active Problems:    S/P CABG x 3    CAD (coronary artery disease)    HTN (hypertension)    Hyperlipidemia with target LDL less than 100    Diabetes mellitus (HCC)    Hemoptysis    Stage 3b chronic kidney disease (HCC)    Dementia (HCC)    Parkinson's disease (HCC)    Multifocal pneumonia  Resolved Problems:    * No resolved hospital problems. *      Additional Pertinent Information and Pt-Reported Symptoms/Complaints: Baseline chronic cough. Baseline wet cough.         Predisposing dysphagia risk factors: Parkinson's Disease, Cognitive Deficit, and Age  Clinical signs of possible chronic dysphagia: N/A  Precipitating dysphagia risk factors: reduced physical mobility    Code Status as listed in chart:  DNR-CCA      Cranial nerve exam:   Unable to complete formal CNE as pt did not follow all commands. No gross facial weakness or asymmetrical anatomical changes noted at baseline nor appreciated during subjective assessment. Will cont to monitor as able.       Laryngeal function exam:   Secretions: Oral mucosa pink and moist  Vocal quality: OMAIRA  MPT: OMAIRA  S/Z ratio: OMAIRA  Pitch range: OMAIRA  Cough: Wet, strong, congested,

## 2024-05-23 NOTE — DISCHARGE INSTR - COC
Continuity of Care Form    Patient Name: Maya Morales   :  1940  MRN:  [id number]    Admit date:  2024  Discharge date:  24    Code Status Order: DNR-CCA   Advance Directives:     Admitting Physician:  Yovanny Moreira MD  PCP: Julio Valenzuela MD    Discharging Nurse: Ana Cristina HERRERA RN  Discharging Hospital Unit/Room#: 0208/0208-01  Discharging Unit Phone Number: [phone]    Emergency Contact:   Extended Emergency Contact Information  Primary Emergency Contact: Radha Ford  Address: [address] [place] [id number] [facility]  Home Phone: [phone]  Relation: Child  Secondary Emergency Contact: Zak Morales  Home Phone: [phone]  Relation: Child    Past Surgical History:  Past Surgical History:   Procedure Laterality Date     SECTION      CHOLECYSTECTOMY      CORONARY ARTERY BYPASS GRAFT  [id number]    FRACTURE SURGERY Left     HIP SURGERY  1/25/15    right hip pinning    HYSTERECTOMY (CERVIX STATUS UNKNOWN)      ROTATOR CUFF REPAIR         Immunization History:   Immunization History   Administered Date(s) Administered    Influenza Virus Vaccine 2007    Td, unspecified formulation 2016       Active Problems:  Patient Active Problem List   Diagnosis Code    S/P CABG x 3 Z95.1    CAD (coronary artery disease) I25.10    HTN (hypertension) I10    Hyperlipidemia with target LDL less than 100 E78.5    Migraine G43.909    Cataract H26.9    Onychomycosis B35.1    Insomnia G47.00    Tremor R25.1    Peripheral neuropathy G62.9    Diabetes mellitus (HCC) E11.9    Hypoxemia requiring supplemental oxygen R09.02, Z99.81    Dyspnea R06.00    Aspiration pneumonitis (formerly [facility]) J69.0    Hypotension I95.9    Severe sepsis (formerly [facility]) A41.9, R65.20    Hypoxemia R09.02    Disorder of electrolytes E87.8    Acute renal failure (ARF) (formerly [facility]) N17.9    Anemia D64.9    Thrombocytopenia (formerly [facility]) D69.6    Altered mental status R41.82    Hip fracture (formerly [facility]) S72.009A

## 2024-05-23 NOTE — CARE COORDINATION
DISCHARGE ORDER  Date/Time 2024 4:44 PM  Completed by: Vi Engel RN, Case Management    Patient Name: Maya Morales    : 1940      Admit order Date and Status:24  Noted discharge order. (verify MD's last order for status of admission/Traditional Medicare 3 [place] Inpatient qualifying stay required for SNF)    Confirmed discharge plan with:              Patient:  Yes              When pt confirms DC plan does any support person need to be contacted by CM No i             Discharge to Facility: [facility]   Facility phone number for staff giving report: [phone]   Pre-certification completed: N/   Hospital Exemption Notification (HENS) completed: N/A-LTC   Discharge orders and Continuity of Care faxed to facility:  Facility will pull from [facility]      Transportation:               Medical Transport explained with choice list offered to pt/family.                Choice:(no preference)  Agency used: [employer]   time:   16:00      Pt/family/Nursing/Facility aware of  time:   Yes Names: Magdalena wood, Pennie YADAV, Ana Cristina nurse, pt and Jarvis at [facility].  Ambulance form completed:  Yes via roundtrip:      Date Last IMM Given: 24    Comments:Order ofr dc noted. Attempted to talk with pt who was non verbal. Attempted to call family but both listed numbers where unavailable. Left VM for Jarvis at [facility] re: dc and . Chart reviewed and no other dc needs identified.    Pt is being d/c'd to Skilled Nursing Facility (SNF)  today. Pt's O2 sats are 93% on RA.    Discharge timeout done with nsg, Cm and pt. All discharge needs and concerns addressed.    Discharging nurse to complete ANTOLIN, reconcile AVS, and place final copy with patient's discharge packet. Discharging RN to ensure that written prescriptions for  Level II medications are sent with patient to the facility as per protocol.

## 2024-05-23 NOTE — FLOWSHEET NOTE
05/20/24 0830   Vital Signs   Temp 99.5 °F (37.5 °C)   Temp Source Oral   Pulse 70   Heart Rate Source Monitor   Respirations 18   BP (!) 148/72   MAP (Calculated) 97   Patient Position Semi fowlers   Pain Assessment   Pain Assessment None - Denies Pain   Oxygen Therapy   SpO2 95 %   O2 Device None (Room air)       Shift assessment complete. See flow sheet. Pt NPO waiting on speech eval to ensure pt is safe to swallow meds foods and liquids.   Patients head-toe complete, VS are logged, and active bowel sound noted in all four quadrants.    Pt sitting up in bed respirations easy and unlabored. No s/s of distress. Alert and oriented to self denies pain or SOB pt stable.     No further needs  noted at this time. Call light and bedside table are within reach. The bed is locked and is in the lowest position.        Mani Grijalva RN   
   05/21/24 0800   Vital Signs   Temp 98.8 °F (37.1 °C)   Temp Source Oral   Pulse 71   Heart Rate Source Monitor   Respirations 16   BP (!) 147/52   MAP (Calculated) 84   Patient Position Semi fowlers   Pain Assessment   Pain Assessment None - Denies Pain   Oxygen Therapy   SpO2 100 %   O2 Device None (Room air)       Shift assessment complete. See flow sheet. Scheduled meds given. See MAR.  Patients head-toe complete, VS are logged, and active bowel sound noted in all four quadrants.    Pt sitting up in bed respirations easy and unlabored. No s/s of distress. Alert and oriented to self denies pain or SOB pt stable    No further needs  noted at this time. Call light and bedside table are within reach. The bed is locked and is in the lowest position.        Mani Grijalva RN   
   05/22/24 1200   Vital Signs   Temp 98.9 °F (37.2 °C)   Temp Source Axillary   Pulse 88   Heart Rate Source Monitor   Respirations 18   BP (!) 170/57   MAP (Calculated) 95   BP Location Right upper arm   BP Method Automatic   Patient Position Semi fowlers   Pain Assessment   Pain Assessment None - Denies Pain   Opioid-Induced Sedation   POSS Score 1   Oxygen Therapy   SpO2 94 %   O2 Device None (Room air)     AM assessment completed. Respirations easy and even. Bed in lowest position, bed alarm in place and functioning properly, bed rails x2 up,  Call light within reach.     Bedside Mobility Assessment Tool (BMAT):     Assessment Level 1- Sit and Shake    1. From a semi-reclined position, ask patient to sit up and rotate to a seated position at the side of the bed. Can use the bedrail.    2. Ask patient to reach out and grab your hand and shake making sure patient reaches across his/her midline.   Fail- Patient is unable to perform tasks, patient is MOBILITY LEVEL 1.    Assessment Level 2- Stretch and Point   1. With patient in seated position at the side of the bed, have patient place both feet on the floor (or stool) with knees no higher than hips.    2. Ask patient to stretch one leg and straighten the knee, then bend the ankle/flex and point the toes. If appropriate, repeat with the other leg.   Fail- Patient is unable to complete task. Patient is MOBILITY LEVEL 2.     Assessment Level 3- Stand   1. Ask patient to elevate off the bed or chair (seated to standing) using an assistive device (cane, bedrail).    2. Patient should be able to raise buttocks off be and hold for a count of five. May repeat once.   Fail- Patient unable to demonstrate standing stability. Patient is MOBILITY LEVEL 3.     Assessment Level 4- Walk   1. Ask patient to march in place at bedside.    2. Then ask patient to advance step and return each foot. Some medical conditions may render a patient from stepping backwards, use your best 
   24 2227   Vital Signs   Temp 99.2 °F (37.3 °C)   Temp Source Oral   Pulse 80   Heart Rate Source Monitor   Respirations 18   /62   MAP (Calculated) 84   BP Location Left upper arm   BP Method Automatic   Patient Position Semi fowlers   Pain Assessment   Pain Assessment None - Denies Pain   Oxygen Therapy   SpO2 97 %   O2 Device None (Room air)     Pt alert to name and . Pt believes she is a [place]. Reoriented pt to time, place and situation. Assessment completed. Pt does have a moist cough and dry blood is noted on her gown. Pt is also picking her nose dry blood noted on the outside of her nose. Excoriation noted to abd folds and breasts. All oral meds held for swallow eval. Pt denies any needs at this time. Call light within reach and bed alarm on  
to raise buttocks off be and hold for a count of five. May repeat once.   Pass- Patient maintains standing stability for at least 5 seconds, proceed to assessment level 4.    Assessment Level 4- Walk   1. Ask patient to march in place at bedside.    2. Then ask patient to advance step and return each foot. Some medical conditions may render a patient from stepping backwards, use your best clinical judgement.   Fail- Patient not able to complete tasks OR requires use of assistive device. Patient is MOBILITY LEVEL 3.       Mobility Level- 3